# Patient Record
Sex: MALE | Race: WHITE | NOT HISPANIC OR LATINO | Employment: FULL TIME | URBAN - METROPOLITAN AREA
[De-identification: names, ages, dates, MRNs, and addresses within clinical notes are randomized per-mention and may not be internally consistent; named-entity substitution may affect disease eponyms.]

---

## 2017-02-12 ENCOUNTER — HOSPITAL ENCOUNTER (OUTPATIENT)
Dept: RADIOLOGY | Facility: MEDICAL CENTER | Age: 37
End: 2017-02-12
Attending: FAMILY MEDICINE
Payer: COMMERCIAL

## 2017-02-12 DIAGNOSIS — G64 OTHER DISORDERS OF PERIPHERAL NERVOUS SYSTEM: ICD-10-CM

## 2017-02-12 PROCEDURE — 72100 X-RAY EXAM L-S SPINE 2/3 VWS: CPT

## 2018-04-11 ENCOUNTER — HOSPITAL ENCOUNTER (EMERGENCY)
Facility: MEDICAL CENTER | Age: 38
End: 2018-04-11
Attending: EMERGENCY MEDICINE
Payer: COMMERCIAL

## 2018-04-11 VITALS
BODY MASS INDEX: 34.2 KG/M2 | SYSTOLIC BLOOD PRESSURE: 135 MMHG | HEIGHT: 71 IN | RESPIRATION RATE: 16 BRPM | WEIGHT: 244.27 LBS | TEMPERATURE: 98.1 F | HEART RATE: 78 BPM | DIASTOLIC BLOOD PRESSURE: 72 MMHG | OXYGEN SATURATION: 100 %

## 2018-04-11 DIAGNOSIS — K22.2 ESOPHAGEAL STRICTURE: ICD-10-CM

## 2018-04-11 PROCEDURE — 700102 HCHG RX REV CODE 250 W/ 637 OVERRIDE(OP): Performed by: EMERGENCY MEDICINE

## 2018-04-11 PROCEDURE — 99284 EMERGENCY DEPT VISIT MOD MDM: CPT

## 2018-04-11 PROCEDURE — A9270 NON-COVERED ITEM OR SERVICE: HCPCS | Performed by: EMERGENCY MEDICINE

## 2018-04-11 RX ADMIN — LIDOCAINE HYDROCHLORIDE 30 ML: 20 SOLUTION OROPHARYNGEAL at 17:03

## 2018-04-11 NOTE — ED TRIAGE NOTES
Chief Complaint   Patient presents with   • Other     Patient states he was eating ribs today and felt like he had a piece stuck in his esophagus. Says he was able to cough some up but then felt a burning sensation. Patient appears anxious states he feels dizzy and is having tingling to bilateral hands. Will inform charge RN of patient. VSS.

## 2018-04-11 NOTE — ED PROVIDER NOTES
"ED Provider Note    CHIEF COMPLAINT  Chief Complaint   Patient presents with   • Other       HPI  William Carroll is a 38 y.o. male who presents for evaluation of difficulty swallowing. The patient reports that he intermittently feels like he gets food stuck in his mid chest. He was eating a large piece of meat and felt like it got stuck but then coughed some of it up and was able to swallow after that. He has never had any known diagnosis of esophageal stricture or had any food bolus impaction. He has never seen gastroenterology is otherwise healthy with no stated or significant medical or surgical history    REVIEW OF SYSTEMS  See HPI for further details. No high fevers chills night sweats or weight loss All other systems are negative.     PAST MEDICAL HISTORY  No past medical history on file.  None reported  FAMILY HISTORY  Noncontributory    SOCIAL HISTORY  Social History     Social History   • Marital status:      Spouse name: N/A   • Number of children: N/A   • Years of education: N/A     Social History Main Topics   • Smoking status: Not on file   • Smokeless tobacco: Not on file   • Alcohol use Not on file   • Drug use: Unknown   • Sexual activity: Not on file     Other Topics Concern   • Not on file     Social History Narrative   • No narrative on file   Denies IV drugs    SURGICAL HISTORY  No past surgical history on file.  No major surgeries  CURRENT MEDICATIONS  Home Medications    **Home medications have not yet been reviewed for this encounter**         ALLERGIES  No Known Allergies    PHYSICAL EXAM  VITAL SIGNS: /83   Pulse 81   Temp 36.7 °C (98.1 °F)   Resp 17   Ht 1.803 m (5' 11\")   Wt 110.8 kg (244 lb 4.3 oz)   SpO2 100%   BMI 34.07 kg/m²  Room air O2: 100    Constitutional: Well developed, Well nourished, No acute distress, Non-toxic appearance.   HENT: Normocephalic, Atraumatic, Bilateral external ears normal, Oropharynx moist, No oral exudates, Nose normal.   Eyes: PERRLA, " EOMI, Conjunctiva normal, No discharge.   Cardiovascular: Normal heart rate, Normal rhythm, No murmurs, No rubs, No gallops.   Thorax & Lungs: Normal breath sounds, No respiratory distress, No wheezing, No chest tenderness.   Abdomen: Bowel sounds normal, Soft, No tenderness, No masses, No pulsatile masses.   Skin: Warm, Dry, No erythema, No rash.   Back: No tenderness, No CVA tenderness.   Extremities: Intact distal pulses, No edema, No tenderness, No cyanosis, No clubbing.   Neurologic: Alert & oriented x 3, Normal motor function, Normal sensory function, No focal deficits noted.   Psychiatric: Anxious      COURSE & MEDICAL DECISION MAKING  The patient has a history and physical exam strongly suggests esophageal stricture. He was able to swallow a GI cocktail and actually provided some relief. I counseled him that he needs to his food, start taking over-the-counter Prilosec and take Maalox as needed. He'll be referred to gastroenterology as I feel he will likely require elective upper endoscopy and stricture dilatation    FINAL IMPRESSION  1.  1. Esophageal stricture          Electronically signed by: Nicho Childs, 4/11/2018 4:51 PM

## 2018-04-12 NOTE — DISCHARGE INSTRUCTIONS
Esophageal Stricture  Start taking an over-the-counter acid blocker such as Prilosec. Take Maalox as needed. She will of your food very well before swallowing follow up with gastroenterology as discussed  Introduction  Esophageal stricture is a condition that causes the esophagus to become narrow. The esophagus is the long tube in your throat that carries food and liquid from your mouth to your stomach. Esophageal stricture can make it difficult, painful, or even impossible to swallow. The condition also makes choking more likely.  What are the causes?  Gastroesophageal reflux disease (GERD) is the most common cause of esophageal stricture. In GERD, stomach acid backs up into the esophagus. Over time, this causes scar tissue and leads to narrowing (stricture).  Other causes of esophageal stricture include:  · Scarring from ingesting a harmful substance.  · Damage from medical instruments used in the esophagus.  · Radiation therapy.  · Cancer.  What increases the risk?  You are at greater risk for esophageal stricture if you have GERD or esophageal cancer.  What are the signs or symptoms?  Signs and symptoms of esophageal stricture include:  · Difficulty swallowing.  · Pain when swallowing.  · Heartburn.  · Vomiting or spitting up (regurgitating) food or liquids.  · Weight loss.  How is this diagnosed?  Your health care provider may suspect esophageal stricture based on your symptoms. A physical exam will also be done. You may need tests to confirm the diagnosis. These can include:  · Upper endoscopy. Your health care provider will insert a flexible tube with a tiny camera on it (endoscope) into your esophagus to check for a stricture. A tissue sample may also be taken to be examined under a microscope (biopsy).  · Esophageal pH monitoring. This test involves collecting acid in the esophagus with a tube to determine how much stomach acid is entering the esophagus.  · Barium swallow test. For this test, you will drink  a barium solution that coats the lining of the esophagus. Then you will have an X-ray taken. The barium solution helps to show if there is stricture.  How is this treated?  Treatment for esophageal stricture depends on what is causing your condition and how severe it is. Treatment options include:  · Esophageal dilatation. In this procedure, a health care provider inserts an endoscope or a tool called a dilator into your esophagus to gently stretch it and make the opening wider.  · Stents. In some cases, your health care provider may place a small device (stent) in the esophagus to keep it open.  · Acid-blocking medicines. Taking these helps manage GERD symptoms after an esophageal stricture. This can prevent the stricture from returning.  Follow these instructions at home:  · Do not drink alcohol.  · Do not use any tobacco products, including cigarettes, chewing tobacco, or electronic cigarettes. If you need help quitting, ask your health care provider.  · Lose weight if you are overweight.  · Wear loose, comfortable clothing.  · Do not eat for 3 hours before bedtime.  · Elevate your head in bed with pillows.  · Do not overeat at meals.  · Do not eat foods that make reflux worse. These include:  ¨ Fatty foods.  ¨ Spicy foods.  ¨ Soda.  ¨ Tomato products.  ¨ Chocolate.  Contact a health care provider if:  · You have problems eating or swallowing.  · You regurgitate food and liquid.  This information is not intended to replace advice given to you by your health care provider. Make sure you discuss any questions you have with your health care provider.  Document Released: 08/28/2007 Document Revised: 05/25/2017 Document Reviewed: 04/29/2015  © 2017 [employer]

## 2018-08-10 ENCOUNTER — OFFICE VISIT (OUTPATIENT)
Dept: URGENT CARE | Facility: PHYSICIAN GROUP | Age: 38
End: 2018-08-10
Payer: COMMERCIAL

## 2018-08-10 VITALS
TEMPERATURE: 99 F | BODY MASS INDEX: 34.87 KG/M2 | RESPIRATION RATE: 16 BRPM | DIASTOLIC BLOOD PRESSURE: 82 MMHG | HEART RATE: 75 BPM | WEIGHT: 250 LBS | OXYGEN SATURATION: 95 % | SYSTOLIC BLOOD PRESSURE: 124 MMHG

## 2018-08-10 DIAGNOSIS — B00.9 HERPES SIMPLEX: ICD-10-CM

## 2018-08-10 PROCEDURE — 99214 OFFICE O/P EST MOD 30 MIN: CPT | Performed by: FAMILY MEDICINE

## 2018-08-10 RX ORDER — FAMCICLOVIR 500 MG/1
1000 TABLET ORAL 2 TIMES DAILY
Qty: 6 TAB | Refills: 0 | Status: SHIPPED | OUTPATIENT
Start: 2018-08-10 | End: 2018-08-12

## 2018-08-10 RX ORDER — OMEPRAZOLE 20 MG/1
20 CAPSULE, DELAYED RELEASE ORAL DAILY
COMMUNITY
End: 2020-10-21 | Stop reason: DRUGHIGH

## 2018-08-10 ASSESSMENT — PAIN SCALES - GENERAL: PAINLEVEL: NO PAIN

## 2018-08-10 NOTE — PROGRESS NOTES
Subjective:      William Carroll is a 38 y.o. male who presents with Cold Sores (cold sore x 2 days)    Patient presents to urgent care with acute onset of new problem over the past 2 days he has developed lesions that have been simultaneous around his nasal area as well as his right sided upper lip.  He has been applying some topical over-the-counter antiviral medicine without any improvement in symptoms.  He denies any systemic symptoms of fevers chills nausea no body aches. He does have a h/o oral herpes simplex    HPI  Review of Systems   All other systems reviewed and are negative.    PMH:esophageal stricture, oral herpes simplex  MEDS:   Current Outpatient Prescriptions:   •  omeprazole (PRILOSEC) 20 MG delayed-release capsule, Take 20 mg by mouth every day., Disp: , Rfl:   •  famciclovir (FAMVIR) 500 MG Tab, Take 2 Tabs by mouth 2 Times a Day for 2 days. With food, Disp: 6 Tab, Rfl: 0  ALLERGIES: No Known Allergies  SURGHX: History reviewed. No pertinent surgical history.  SOCHX:  reports that he has never smoked. He has never used smokeless tobacco.  FH: Family history was reviewed, no pertinent findings to report     Objective:     /82   Pulse 75   Temp 37.2 °C (99 °F)   Resp 16   Wt 113.4 kg (250 lb)   SpO2 95%   BMI 34.87 kg/m²      Physical Exam   Constitutional: He is oriented to person, place, and time. He appears well-developed and well-nourished. No distress.   HENT:   Head:       Mouth/Throat: Oropharynx is clear and moist.   3 distinct areas on the nasal area an erythematous patch with vesicular lesions negative Fields sign no lesions on the eyes.   Eyes: Pupils are equal, round, and reactive to light. Conjunctivae and EOM are normal.   Neck: Normal range of motion.   Cardiovascular: Normal rate.    Pulmonary/Chest: Effort normal.   Musculoskeletal: Normal range of motion. He exhibits no edema or tenderness.   Lymphadenopathy:     He has no cervical adenopathy.   Neurological: He  is alert and oriented to person, place, and time.   Skin: Skin is warm and dry. Rash noted. He is not diaphoretic. There is erythema.   Psychiatric: He has a normal mood and affect. His behavior is normal.          Assessment/Plan:     1. Herpes simplex    - famciclovir (FAMVIR) 500 MG Tab; Take 2 Tabs by mouth 2 Times a Day for 2 days. With food  Dispense: 6 Tab; Refill: 0      Likely oral herpes simplex but given the distribution around the nasal area must also consider zoster.    Differential diagnosis, natural history, supportive care discussed. Follow-up with primary care provider within 7-10 days, emergency room precautions discussed.  Patient and/or family appears understanding of information.

## 2018-08-13 ENCOUNTER — OFFICE VISIT (OUTPATIENT)
Dept: URGENT CARE | Facility: PHYSICIAN GROUP | Age: 38
End: 2018-08-13
Payer: COMMERCIAL

## 2018-08-13 VITALS
SYSTOLIC BLOOD PRESSURE: 122 MMHG | WEIGHT: 250 LBS | BODY MASS INDEX: 35 KG/M2 | RESPIRATION RATE: 18 BRPM | OXYGEN SATURATION: 96 % | HEART RATE: 76 BPM | DIASTOLIC BLOOD PRESSURE: 68 MMHG | HEIGHT: 71 IN | TEMPERATURE: 98.3 F

## 2018-08-13 DIAGNOSIS — B00.9 HERPES SIMPLEX DISEASE: ICD-10-CM

## 2018-08-13 PROCEDURE — 99214 OFFICE O/P EST MOD 30 MIN: CPT | Performed by: NURSE PRACTITIONER

## 2018-08-13 RX ORDER — FAMCICLOVIR 500 MG/1
500 TABLET ORAL 3 TIMES DAILY
Qty: 21 TAB | Refills: 0 | Status: SHIPPED | OUTPATIENT
Start: 2018-08-13 | End: 2018-08-20

## 2018-08-13 ASSESSMENT — ENCOUNTER SYMPTOMS
NAUSEA: 0
DIZZINESS: 0
CHILLS: 0
HEADACHES: 0
SENSORY CHANGE: 1
TINGLING: 1
VOMITING: 0
DIARRHEA: 0
FEVER: 0
MYALGIAS: 0

## 2018-08-14 NOTE — PROGRESS NOTES
"Subjective:      William Carroll is a 38 y.o. male who presents with Cold Sores (cold sore on R side of nose)            HPI Recurrent problem. 38 year old male with cold sores since last week. Was seen on Friday and given famciclovir for 2 days. He presents today for re check. Sores are scabbed but he feels occasional tingle in right maxillary area. Denies fever, chills, myalgia, nausea or diarrhea.   Patient has no known allergies.  Current Outpatient Prescriptions on File Prior to Visit   Medication Sig Dispense Refill   • omeprazole (PRILOSEC) 20 MG delayed-release capsule Take 20 mg by mouth every day.       No current facility-administered medications on file prior to visit.      Social History     Social History   • Marital status:      Spouse name: N/A   • Number of children: N/A   • Years of education: N/A     Occupational History   • Not on file.     Social History Main Topics   • Smoking status: Never Smoker   • Smokeless tobacco: Never Used   • Alcohol use Not on file   • Drug use: Unknown   • Sexual activity: Not on file     Other Topics Concern   • Not on file     Social History Narrative   • No narrative on file     family history is not on file.      Review of Systems   Constitutional: Negative for chills and fever.   Gastrointestinal: Negative for diarrhea, nausea and vomiting.   Musculoskeletal: Negative for myalgias.   Skin: Positive for rash.   Neurological: Positive for tingling and sensory change. Negative for dizziness and headaches.          Objective:     /68   Pulse 76   Temp 36.8 °C (98.3 °F)   Resp 18   Ht 1.803 m (5' 11\")   Wt 113.4 kg (250 lb)   SpO2 96%   BMI 34.87 kg/m²      Physical Exam   Constitutional: He is oriented to person, place, and time. He appears well-developed and well-nourished. No distress.   HENT:   Head: Normocephalic and atraumatic.   Right Ear: External ear and ear canal normal. Tympanic membrane is not injected and not perforated. No middle " ear effusion.   Left Ear: External ear and ear canal normal. Tympanic membrane is not injected and not perforated.  No middle ear effusion.   Nose: No mucosal edema.   Mouth/Throat: No oropharyngeal exudate or posterior oropharyngeal erythema.   Eyes: Conjunctivae are normal. Right eye exhibits no discharge. Left eye exhibits no discharge.   Neck: Normal range of motion. Neck supple.   Cardiovascular: Normal rate, regular rhythm and normal heart sounds.    No murmur heard.  Pulmonary/Chest: Effort normal and breath sounds normal. No respiratory distress.   Musculoskeletal: Normal range of motion.   Normal movement of all 4 extremities.   Lymphadenopathy:     He has no cervical adenopathy.        Right: No supraclavicular adenopathy present.        Left: No supraclavicular adenopathy present.   Neurological: He is alert and oriented to person, place, and time. Gait normal.   Skin: Skin is warm and dry. Rash noted. Rash is vesicular.        Psychiatric: He has a normal mood and affect. His behavior is normal. Thought content normal.   Nursing note and vitals reviewed.              Assessment/Plan:     1. Herpes simplex disease  famciclovir (FAMVIR) 500 MG Tab     Reassurance that all looks good.  Because of continued tingling sensation, will add to famiciclovir should he need it. Discussed using only if he has new lesions appear. At this time all lesions are dry and scabbed. Cautioned on appearance of eye/inner ear symptoms.  Patient demonstrates good understanding of treatment plan as well as follow up instructions.

## 2018-08-16 ENCOUNTER — OFFICE VISIT (OUTPATIENT)
Dept: URGENT CARE | Facility: PHYSICIAN GROUP | Age: 38
End: 2018-08-16
Payer: COMMERCIAL

## 2018-08-16 VITALS
SYSTOLIC BLOOD PRESSURE: 122 MMHG | RESPIRATION RATE: 18 BRPM | HEIGHT: 71 IN | TEMPERATURE: 98.8 F | HEART RATE: 94 BPM | BODY MASS INDEX: 35 KG/M2 | OXYGEN SATURATION: 94 % | DIASTOLIC BLOOD PRESSURE: 80 MMHG | WEIGHT: 250 LBS

## 2018-08-16 DIAGNOSIS — R11.10 INTRACTABLE VOMITING, PRESENCE OF NAUSEA NOT SPECIFIED, UNSPECIFIED VOMITING TYPE: ICD-10-CM

## 2018-08-16 PROCEDURE — 99213 OFFICE O/P EST LOW 20 MIN: CPT | Performed by: EMERGENCY MEDICINE

## 2018-08-16 RX ORDER — ONDANSETRON 4 MG/1
4 TABLET, ORALLY DISINTEGRATING ORAL EVERY 6 HOURS PRN
Qty: 10 TAB | Refills: 0 | Status: SHIPPED | OUTPATIENT
Start: 2018-08-16 | End: 2019-01-15

## 2018-08-16 RX ORDER — ONDANSETRON 4 MG/1
4 TABLET, ORALLY DISINTEGRATING ORAL EVERY 8 HOURS PRN
Qty: 10 TAB | Refills: 0 | Status: SHIPPED | OUTPATIENT
Start: 2018-08-16

## 2018-08-16 NOTE — PROGRESS NOTES
"Subjective:      William Carroll is a 38 y.o. male who presents with Emesis (abd pain, vomiting starting this morning)            HPI  Patient is a 38-year-old male complaining of vomiting associated abdominal pain started this morning patient has been recently evaluated for this in the urgent care.as well as in the ED and referred to GI (suspected stricture)  Pt now has vomtiing and some diarrhea, no risk factors of travel or antibiotics, etc.  PMH:  has no past medical history on file.  MEDS:   Current Outpatient Prescriptions:   •  ondansetron (ZOFRAN ODT) 4 MG TABLET DISPERSIBLE, Take 1 Tab by mouth every 8 hours as needed., Disp: 10 Tab, Rfl: 0  •  ondansetron (ZOFRAN ODT) 4 MG TABLET DISPERSIBLE, Take 1 Tab by mouth every 6 hours as needed for Nausea., Disp: 10 Tab, Rfl: 0  •  famciclovir (FAMVIR) 500 MG Tab, Take 1 Tab by mouth 3 times a day for 7 days., Disp: 21 Tab, Rfl: 0  •  omeprazole (PRILOSEC) 20 MG delayed-release capsule, Take 20 mg by mouth every day., Disp: , Rfl:   ALLERGIES: No Known Allergies  SURGHX: History reviewed. No pertinent surgical history.  SOCHX:  reports that he has never smoked. He has never used smokeless tobacco.  FH: family history is not on file.  Review of Systems   Constitutional: Negative for chills and fever.   HENT: Negative for congestion.    Respiratory: Negative for cough, sputum production and shortness of breath.    Cardiovascular: Negative for chest pain and palpitations.   Gastrointestinal: Negative for abdominal pain, diarrhea, nausea and vomiting.   Genitourinary: Negative.    Musculoskeletal: Negative for neck pain.   Skin: Positive for rash.        Herpes labialis   Neurological: Negative for sensory change and speech change.   Psychiatric/Behavioral: The patient is not nervous/anxious.           Objective:     /80   Pulse 94   Temp 37.1 °C (98.8 °F)   Resp 18   Ht 1.803 m (5' 11\")   Wt 113.4 kg (250 lb)   SpO2 94%   BMI 34.87 kg/m²      Physical " Exam   Constitutional: He appears well-developed and well-nourished. No distress.   HENT:   Head: Normocephalic and atraumatic.   Right Ear: External ear normal.   Eyes: Conjunctivae are normal. No scleral icterus.   Neck: Normal range of motion.   Cardiovascular: Normal rate and regular rhythm.    Pulmonary/Chest: Effort normal and breath sounds normal. No respiratory distress. He has no wheezes. He has no rales.   Abdominal: He exhibits no distension. There is no tenderness.   Musculoskeletal: Normal range of motion.   Neurological: He is alert.   Skin: Skin is warm and dry. Rash (residual herpes on lip) noted. He is not diaphoretic.   Psychiatric: He has a normal mood and affect. His behavior is normal.   Nursing note and vitals reviewed.              Assessment/Plan:     Diagnosis: Vomiting.    Pt will be started on Zofran and needs to go to the ED if unable to hold down liquids, will bring in stool if diarrhea continues and a note for 1-2 off from work

## 2018-08-16 NOTE — LETTER
August 16, 2018        William Carroll  [address] [address]        Dear William:    Please aspirin next 1-2 days off from work for medical reasons.    If you have any questions or concerns, please don't hesitate to call.        Sincerely,        Oskar Johnson M.D.    Electronically Signed

## 2018-08-17 ENCOUNTER — HOSPITAL ENCOUNTER (OUTPATIENT)
Facility: MEDICAL CENTER | Age: 38
End: 2018-08-17
Attending: EMERGENCY MEDICINE
Payer: COMMERCIAL

## 2018-08-17 DIAGNOSIS — R11.10 INTRACTABLE VOMITING, PRESENCE OF NAUSEA NOT SPECIFIED, UNSPECIFIED VOMITING TYPE: ICD-10-CM

## 2018-08-17 PROCEDURE — 87045 FECES CULTURE AEROBIC BACT: CPT

## 2018-08-17 PROCEDURE — 87899 AGENT NOS ASSAY W/OPTIC: CPT

## 2018-08-17 PROCEDURE — 87046 STOOL CULTR AEROBIC BACT EA: CPT

## 2018-08-18 LAB
E COLI SXT1+2 STL IA: NORMAL
SIGNIFICANT IND 70042: NORMAL
SITE SITE: NORMAL
SOURCE SOURCE: NORMAL

## 2018-08-18 ASSESSMENT — ENCOUNTER SYMPTOMS
SHORTNESS OF BREATH: 0
NERVOUS/ANXIOUS: 0
SPUTUM PRODUCTION: 0
ABDOMINAL PAIN: 0
ROS SKIN COMMENTS: HERPES LABIALIS
COUGH: 0
CHILLS: 0
SPEECH CHANGE: 0
FEVER: 0
NECK PAIN: 0
VOMITING: 0
SENSORY CHANGE: 0
PALPITATIONS: 0
DIARRHEA: 0
NAUSEA: 0

## 2018-08-20 LAB
BACTERIA STL CULT: NORMAL
E COLI SXT1+2 STL IA: NORMAL
SIGNIFICANT IND 70042: NORMAL
SITE SITE: NORMAL
SOURCE SOURCE: NORMAL

## 2018-08-22 ENCOUNTER — TELEPHONE (OUTPATIENT)
Dept: URGENT CARE | Facility: PHYSICIAN GROUP | Age: 38
End: 2018-08-22

## 2019-01-15 ENCOUNTER — OFFICE VISIT (OUTPATIENT)
Dept: MEDICAL GROUP | Facility: PHYSICIAN GROUP | Age: 39
End: 2019-01-15
Payer: COMMERCIAL

## 2019-01-15 VITALS
WEIGHT: 244 LBS | BODY MASS INDEX: 34.16 KG/M2 | HEIGHT: 71 IN | TEMPERATURE: 97.9 F | SYSTOLIC BLOOD PRESSURE: 122 MMHG | RESPIRATION RATE: 16 BRPM | DIASTOLIC BLOOD PRESSURE: 80 MMHG | OXYGEN SATURATION: 94 % | HEART RATE: 71 BPM

## 2019-01-15 DIAGNOSIS — Z76.89 ENCOUNTER TO ESTABLISH CARE WITH NEW DOCTOR: ICD-10-CM

## 2019-01-15 DIAGNOSIS — Z00.00 WELLNESS EXAMINATION: ICD-10-CM

## 2019-01-15 DIAGNOSIS — K21.9 GASTROESOPHAGEAL REFLUX DISEASE, ESOPHAGITIS PRESENCE NOT SPECIFIED: ICD-10-CM

## 2019-01-15 PROCEDURE — 99213 OFFICE O/P EST LOW 20 MIN: CPT | Performed by: NURSE PRACTITIONER

## 2019-01-15 ASSESSMENT — PATIENT HEALTH QUESTIONNAIRE - PHQ9
SUM OF ALL RESPONSES TO PHQ QUESTIONS 1-9: 7
5. POOR APPETITE OR OVEREATING: 1 - SEVERAL DAYS
CLINICAL INTERPRETATION OF PHQ2 SCORE: 2

## 2019-01-16 NOTE — PROGRESS NOTES
Chief Complaint   Patient presents with   • Establish Care   • Gastrophageal Reflux     x1year        HISTORY OF PRESENT ILLNESS: Patient is a 38 y.o. male new patient who presents today to discuss the following issues:    Encounter to establish care with new doctor  Is here to establish with a new primary care provider.        Gastroesophageal reflux disease  Has ongoing intermittent issues with GERD.  Takes prilosec as needed.  Discussed avoiding triggers, taking the omeprazole more regularly, and avoid laying down after eating.      Patient Active Problem List    Diagnosis Date Noted   • Gastroesophageal reflux disease 01/22/2019   • Encounter to establish care with new doctor 01/15/2019   • Wellness examination 01/15/2019       Allergies:Patient has no known allergies.    Current Outpatient Prescriptions   Medication Sig Dispense Refill   • omeprazole (PRILOSEC) 20 MG delayed-release capsule Take 20 mg by mouth every day.     • ondansetron (ZOFRAN ODT) 4 MG TABLET DISPERSIBLE Take 1 Tab by mouth every 8 hours as needed. 10 Tab 0     No current facility-administered medications for this visit.        Social History   Substance Use Topics   • Smoking status: Never Smoker   • Smokeless tobacco: Never Used   • Alcohol use No       No family status information on file.   History reviewed. No pertinent family history.    Review of Systems:   Constitutional: Negative for fever, chills, weight loss and malaise/fatigue.   HENT: Negative for ear pain, nosebleeds, congestion, sore throat and neck pain.    Eyes: Negative for blurred vision.   Respiratory: Negative for cough, sputum production, shortness of breath and wheezing.    Cardiovascular: Negative for chest pain, palpitations, orthopnea and leg swelling.   Gastrointestinal: Negative for nausea, vomiting and abdominal pain. Positive for heartburn.  Genitourinary: Negative for dysuria, urgency and frequency.   Musculoskeletal: Negative for myalgias, joint pain, and  "back pain.  Skin: Negative for rash and itching.   Neurological: Negative for dizziness, tingling, tremors, sensory change, focal weakness and headaches.   Endo/Heme/Allergies: Does not bruise/bleed easily.   Psychiatric/Behavioral: Negative for depression, suicidal ideas and memory loss.  The patient is not nervous/anxious and does not have insomnia.    All other systems reviewed and are negative except as in HPI.    Exam:  Blood pressure 122/80, pulse 71, temperature 36.6 °C (97.9 °F), resp. rate 16, height 1.803 m (5' 11\"), weight 110.7 kg (244 lb), SpO2 94 %.  General:  Well nourished, well developed male in NAD  Head: Grossly normal.  Neck: Supple without JVD or bruit. Thyroid is not enlarged.  Pulmonary: Clear to ausculation. Normal effort. No rales, ronchi, or wheezing.  Cardiovascular: Regular rate and rhythm without murmur.   Abdomen:  Bowel sounds + x 4. Soft, non-tender, nondistended.  Extremities: No clubbing, cyanosis, or edema.  Skin: Intact with no obvious rashes or lesions.  Neuro: Grossly intact.  Psych: Alert and oriented x 3.  Mood and affect appropriate.    Medical decision-making and discussion: William is here to establish with a new primary care provider.  We reviewed his past medical history and discussed his current medications.  Lab work was ordered. He will sign a records release for his previous provider, he will sign up with [facility], and he will plan to follow-up here as needed.         Assessment/Plan:  1. Wellness examination  CBC WITH DIFFERENTIAL    COMP METABOLIC PANEL    Lipid Profile    VITAMIN D,25 HYDROXY   2. Encounter to establish care with new doctor     3. Gastroesophageal reflux disease, esophagitis presence not specified         Return if symptoms worsen or fail to improve.    Please note that this dictation was created using voice recognition software. I have made every reasonable attempt to correct obvious errors, but I expect that there are errors of grammar and possibly " content that I did not discover before finalizing the note.

## 2019-01-22 PROBLEM — K21.9 GASTROESOPHAGEAL REFLUX DISEASE: Status: ACTIVE | Noted: 2019-01-22

## 2019-01-22 NOTE — ASSESSMENT & PLAN NOTE
Has ongoing intermittent issues with GERD.  Takes prilosec as needed.  Discussed avoiding triggers, taking the omeprazole more regularly, and avoid laying down after eating.

## 2019-01-30 ENCOUNTER — OFFICE VISIT (OUTPATIENT)
Dept: URGENT CARE | Facility: PHYSICIAN GROUP | Age: 39
End: 2019-01-30
Payer: COMMERCIAL

## 2019-01-30 ENCOUNTER — HOSPITAL ENCOUNTER (OUTPATIENT)
Dept: LAB | Facility: MEDICAL CENTER | Age: 39
End: 2019-01-30
Attending: NURSE PRACTITIONER
Payer: COMMERCIAL

## 2019-01-30 VITALS
TEMPERATURE: 97.4 F | OXYGEN SATURATION: 97 % | BODY MASS INDEX: 32.9 KG/M2 | SYSTOLIC BLOOD PRESSURE: 124 MMHG | HEART RATE: 79 BPM | WEIGHT: 235 LBS | DIASTOLIC BLOOD PRESSURE: 78 MMHG | RESPIRATION RATE: 16 BRPM | HEIGHT: 71 IN

## 2019-01-30 DIAGNOSIS — Z00.00 WELLNESS EXAMINATION: ICD-10-CM

## 2019-01-30 DIAGNOSIS — J02.9 PHARYNGITIS, UNSPECIFIED ETIOLOGY: ICD-10-CM

## 2019-01-30 DIAGNOSIS — R05.9 COUGH: ICD-10-CM

## 2019-01-30 LAB
25(OH)D3 SERPL-MCNC: 9 NG/ML (ref 30–100)
ALBUMIN SERPL BCP-MCNC: 4.6 G/DL (ref 3.2–4.9)
ALBUMIN/GLOB SERPL: 1.3 G/DL
ALP SERPL-CCNC: 55 U/L (ref 30–99)
ALT SERPL-CCNC: 22 U/L (ref 2–50)
ANION GAP SERPL CALC-SCNC: 11 MMOL/L (ref 0–11.9)
AST SERPL-CCNC: 23 U/L (ref 12–45)
BASOPHILS # BLD AUTO: 0.6 % (ref 0–1.8)
BASOPHILS # BLD: 0.04 K/UL (ref 0–0.12)
BILIRUB SERPL-MCNC: 0.8 MG/DL (ref 0.1–1.5)
BUN SERPL-MCNC: 14 MG/DL (ref 8–22)
CALCIUM SERPL-MCNC: 9.6 MG/DL (ref 8.5–10.5)
CHLORIDE SERPL-SCNC: 104 MMOL/L (ref 96–112)
CHOLEST SERPL-MCNC: 199 MG/DL (ref 100–199)
CO2 SERPL-SCNC: 27 MMOL/L (ref 20–33)
CREAT SERPL-MCNC: 0.93 MG/DL (ref 0.5–1.4)
EOSINOPHIL # BLD AUTO: 0.19 K/UL (ref 0–0.51)
EOSINOPHIL NFR BLD: 3.1 % (ref 0–6.9)
ERYTHROCYTE [DISTWIDTH] IN BLOOD BY AUTOMATED COUNT: 41.3 FL (ref 35.9–50)
FASTING STATUS PATIENT QL REPORTED: NORMAL
GLOBULIN SER CALC-MCNC: 3.6 G/DL (ref 1.9–3.5)
GLUCOSE SERPL-MCNC: 95 MG/DL (ref 65–99)
HCT VFR BLD AUTO: 50.3 % (ref 42–52)
HDLC SERPL-MCNC: 32 MG/DL
HGB BLD-MCNC: 16.8 G/DL (ref 14–18)
IMM GRANULOCYTES # BLD AUTO: 0.02 K/UL (ref 0–0.11)
IMM GRANULOCYTES NFR BLD AUTO: 0.3 % (ref 0–0.9)
LDLC SERPL CALC-MCNC: 147 MG/DL
LYMPHOCYTES # BLD AUTO: 1.22 K/UL (ref 1–4.8)
LYMPHOCYTES NFR BLD: 19.7 % (ref 22–41)
MCH RBC QN AUTO: 29.6 PG (ref 27–33)
MCHC RBC AUTO-ENTMCNC: 33.4 G/DL (ref 33.7–35.3)
MCV RBC AUTO: 88.7 FL (ref 81.4–97.8)
MONOCYTES # BLD AUTO: 0.7 K/UL (ref 0–0.85)
MONOCYTES NFR BLD AUTO: 11.3 % (ref 0–13.4)
NEUTROPHILS # BLD AUTO: 4.01 K/UL (ref 1.82–7.42)
NEUTROPHILS NFR BLD: 65 % (ref 44–72)
NRBC # BLD AUTO: 0 K/UL
NRBC BLD-RTO: 0 /100 WBC
PLATELET # BLD AUTO: 211 K/UL (ref 164–446)
PMV BLD AUTO: 11 FL (ref 9–12.9)
POTASSIUM SERPL-SCNC: 3.9 MMOL/L (ref 3.6–5.5)
PROT SERPL-MCNC: 8.2 G/DL (ref 6–8.2)
RBC # BLD AUTO: 5.67 M/UL (ref 4.7–6.1)
SODIUM SERPL-SCNC: 142 MMOL/L (ref 135–145)
TRIGL SERPL-MCNC: 99 MG/DL (ref 0–149)
WBC # BLD AUTO: 6.2 K/UL (ref 4.8–10.8)

## 2019-01-30 PROCEDURE — 85025 COMPLETE CBC W/AUTO DIFF WBC: CPT

## 2019-01-30 PROCEDURE — 99214 OFFICE O/P EST MOD 30 MIN: CPT | Performed by: FAMILY MEDICINE

## 2019-01-30 PROCEDURE — 36415 COLL VENOUS BLD VENIPUNCTURE: CPT

## 2019-01-30 PROCEDURE — 82306 VITAMIN D 25 HYDROXY: CPT

## 2019-01-30 PROCEDURE — 80053 COMPREHEN METABOLIC PANEL: CPT

## 2019-01-30 PROCEDURE — 80061 LIPID PANEL: CPT

## 2019-01-30 RX ORDER — PROMETHAZINE HYDROCHLORIDE AND CODEINE PHOSPHATE 6.25; 1 MG/5ML; MG/5ML
5 SYRUP ORAL 4 TIMES DAILY PRN
Qty: 120 ML | Refills: 0 | Status: SHIPPED | OUTPATIENT
Start: 2019-01-30 | End: 2019-02-06

## 2019-01-30 RX ORDER — AZITHROMYCIN 250 MG/1
TABLET, FILM COATED ORAL
Qty: 6 TAB | Refills: 0 | Status: SHIPPED | OUTPATIENT
Start: 2019-01-30 | End: 2020-09-21

## 2019-01-30 ASSESSMENT — ENCOUNTER SYMPTOMS
WEIGHT LOSS: 0
SENSORY CHANGE: 0
FOCAL WEAKNESS: 0
MYALGIAS: 0
EYE REDNESS: 0
EYE DISCHARGE: 0

## 2019-01-30 NOTE — NON-PROVIDER
Started Saturday night with mild to moderate sore throat. Taking DayQuil, Mucinex, and sore throat tea. By Monday afternoon felt mucous forming.   + Postnasal drip.   + Productive cough, thick yellow. sputum.   + Low appetite.   + Bodyaches.   No bloody or samantha   No fevers or chills.  No ear pain or pressure.  No SOB. No c/p.  No frontal or maxillary pain or pressure.   No hx asthma or PNA.

## 2019-01-30 NOTE — PROGRESS NOTES
"Subjective:      William Carroll is a 38 y.o. male who presents with Cough (cough, slight sore throat, congestion x 5days)            4 days productive cough without blood in sputum.  No fever chills.  Associated sore throat nasal congestion.  No shortness of breath or wheezing.  No past medical history of asthma or pneumonia.  Minimal relief with OTC medications.  Symptoms are moderate severity and persistent.  No other aggravating or alleviating factors.        Review of Systems   Constitutional: Negative for malaise/fatigue and weight loss.   HENT: Negative for ear discharge and ear pain.    Eyes: Negative for discharge and redness.   Musculoskeletal: Negative for joint pain and myalgias.   Skin: Negative for itching and rash.   Neurological: Negative for sensory change and focal weakness.     .  Medications, Allergies, and current problem list reviewed today in Epic       Objective:     /78 (BP Location: Left arm, Patient Position: Sitting, BP Cuff Size: Adult)   Pulse 79   Temp 36.3 °C (97.4 °F) (Temporal)   Resp 16   Ht 1.803 m (5' 11\")   Wt 106.6 kg (235 lb)   SpO2 97%   BMI 32.78 kg/m²      Physical Exam   Constitutional: He is oriented to person, place, and time. He appears well-developed and well-nourished. No distress.   HENT:   Head: Normocephalic and atraumatic.   Right Ear: External ear normal.   Left Ear: External ear normal.   Nasal congestion  Pharynx red without exudate     Eyes: Conjunctivae are normal.   Neck: Neck supple.   Cardiovascular: Normal rate, regular rhythm and normal heart sounds.    Pulmonary/Chest: Effort normal and breath sounds normal. He has no wheezes.   Lymphadenopathy:     He has no cervical adenopathy.   Neurological: He is alert and oriented to person, place, and time.   Skin: Skin is warm and dry. No rash noted.               Assessment/Plan:       "

## 2019-01-30 NOTE — LETTER
January 30, 2019         Patient: William Carroll   YOB: 1980   Date of Visit: 1/30/2019           To Whom it May Concern:    William Carroll was seen in my clinic on 1/30/2019.  Please excuse 1/28, 1/30, and 1/31/2019.      Sincerely,           Gerardo Puri M.D.  Electronically Signed

## 2019-02-05 RX ORDER — ERGOCALCIFEROL 1.25 MG/1
CAPSULE ORAL
Qty: 16 CAP | Refills: 0 | Status: SHIPPED | OUTPATIENT
Start: 2019-02-05

## 2019-03-13 RX ORDER — ERGOCALCIFEROL 1.25 MG/1
CAPSULE ORAL
Refills: 0 | OUTPATIENT
Start: 2019-03-13

## 2020-09-21 ENCOUNTER — HOSPITAL ENCOUNTER (OUTPATIENT)
Dept: LAB | Facility: MEDICAL CENTER | Age: 40
End: 2020-09-21
Attending: PHYSICIAN ASSISTANT
Payer: COMMERCIAL

## 2020-09-21 ENCOUNTER — OFFICE VISIT (OUTPATIENT)
Dept: MEDICAL GROUP | Facility: PHYSICIAN GROUP | Age: 40
End: 2020-09-21
Payer: COMMERCIAL

## 2020-09-21 VITALS
DIASTOLIC BLOOD PRESSURE: 82 MMHG | OXYGEN SATURATION: 96 % | HEIGHT: 71 IN | BODY MASS INDEX: 36.68 KG/M2 | WEIGHT: 262 LBS | SYSTOLIC BLOOD PRESSURE: 130 MMHG | RESPIRATION RATE: 12 BRPM | HEART RATE: 81 BPM | TEMPERATURE: 97.3 F

## 2020-09-21 DIAGNOSIS — R10.32 LEFT LOWER QUADRANT ABDOMINAL PAIN: ICD-10-CM

## 2020-09-21 DIAGNOSIS — E55.9 VITAMIN D DEFICIENCY: ICD-10-CM

## 2020-09-21 DIAGNOSIS — Z00.00 GENERAL MEDICAL EXAM: ICD-10-CM

## 2020-09-21 DIAGNOSIS — E78.5 DYSLIPIDEMIA: ICD-10-CM

## 2020-09-21 DIAGNOSIS — K21.9 GASTROESOPHAGEAL REFLUX DISEASE, ESOPHAGITIS PRESENCE NOT SPECIFIED: ICD-10-CM

## 2020-09-21 LAB
25(OH)D3 SERPL-MCNC: 22 NG/ML (ref 30–100)
ALBUMIN SERPL BCP-MCNC: 4.5 G/DL (ref 3.2–4.9)
ALBUMIN/GLOB SERPL: 1.5 G/DL
ALP SERPL-CCNC: 70 U/L (ref 30–99)
ALT SERPL-CCNC: 31 U/L (ref 2–50)
ANION GAP SERPL CALC-SCNC: 12 MMOL/L (ref 7–16)
APPEARANCE UR: CLEAR
AST SERPL-CCNC: 22 U/L (ref 12–45)
BASOPHILS # BLD AUTO: 0.9 % (ref 0–1.8)
BASOPHILS # BLD: 0.05 K/UL (ref 0–0.12)
BILIRUB SERPL-MCNC: 0.3 MG/DL (ref 0.1–1.5)
BILIRUB UR QL STRIP.AUTO: NEGATIVE
BUN SERPL-MCNC: 16 MG/DL (ref 8–22)
CALCIUM SERPL-MCNC: 9.9 MG/DL (ref 8.5–10.5)
CHLORIDE SERPL-SCNC: 103 MMOL/L (ref 96–112)
CHOLEST SERPL-MCNC: 234 MG/DL (ref 100–199)
CO2 SERPL-SCNC: 24 MMOL/L (ref 20–33)
COLOR UR: YELLOW
CREAT SERPL-MCNC: 0.83 MG/DL (ref 0.5–1.4)
EOSINOPHIL # BLD AUTO: 0.14 K/UL (ref 0–0.51)
EOSINOPHIL NFR BLD: 2.5 % (ref 0–6.9)
ERYTHROCYTE [DISTWIDTH] IN BLOOD BY AUTOMATED COUNT: 42.7 FL (ref 35.9–50)
FASTING STATUS PATIENT QL REPORTED: NORMAL
GLOBULIN SER CALC-MCNC: 3 G/DL (ref 1.9–3.5)
GLUCOSE SERPL-MCNC: 99 MG/DL (ref 65–99)
GLUCOSE UR STRIP.AUTO-MCNC: NEGATIVE MG/DL
HCT VFR BLD AUTO: 48.7 % (ref 42–52)
HDLC SERPL-MCNC: 38 MG/DL
HGB BLD-MCNC: 16.4 G/DL (ref 14–18)
IMM GRANULOCYTES # BLD AUTO: 0.03 K/UL (ref 0–0.11)
IMM GRANULOCYTES NFR BLD AUTO: 0.5 % (ref 0–0.9)
KETONES UR STRIP.AUTO-MCNC: NEGATIVE MG/DL
LDLC SERPL CALC-MCNC: 163 MG/DL
LEUKOCYTE ESTERASE UR QL STRIP.AUTO: NEGATIVE
LYMPHOCYTES # BLD AUTO: 1.62 K/UL (ref 1–4.8)
LYMPHOCYTES NFR BLD: 29 % (ref 22–41)
MCH RBC QN AUTO: 29.7 PG (ref 27–33)
MCHC RBC AUTO-ENTMCNC: 33.7 G/DL (ref 33.7–35.3)
MCV RBC AUTO: 88.1 FL (ref 81.4–97.8)
MICRO URNS: NORMAL
MONOCYTES # BLD AUTO: 0.61 K/UL (ref 0–0.85)
MONOCYTES NFR BLD AUTO: 10.9 % (ref 0–13.4)
NEUTROPHILS # BLD AUTO: 3.13 K/UL (ref 1.82–7.42)
NEUTROPHILS NFR BLD: 56.2 % (ref 44–72)
NITRITE UR QL STRIP.AUTO: NEGATIVE
NRBC # BLD AUTO: 0 K/UL
NRBC BLD-RTO: 0 /100 WBC
PH UR STRIP.AUTO: 6 [PH] (ref 5–8)
PLATELET # BLD AUTO: 200 K/UL (ref 164–446)
PMV BLD AUTO: 11.4 FL (ref 9–12.9)
POTASSIUM SERPL-SCNC: 4.6 MMOL/L (ref 3.6–5.5)
PROT SERPL-MCNC: 7.5 G/DL (ref 6–8.2)
PROT UR QL STRIP: NEGATIVE MG/DL
RBC # BLD AUTO: 5.53 M/UL (ref 4.7–6.1)
RBC UR QL AUTO: NEGATIVE
SODIUM SERPL-SCNC: 139 MMOL/L (ref 135–145)
SP GR UR STRIP.AUTO: 1.01
TRIGL SERPL-MCNC: 163 MG/DL (ref 0–149)
TSH SERPL DL<=0.005 MIU/L-ACNC: 2.28 UIU/ML (ref 0.38–5.33)
UROBILINOGEN UR STRIP.AUTO-MCNC: 0.2 MG/DL
WBC # BLD AUTO: 5.6 K/UL (ref 4.8–10.8)

## 2020-09-21 PROCEDURE — 84443 ASSAY THYROID STIM HORMONE: CPT

## 2020-09-21 PROCEDURE — 85025 COMPLETE CBC W/AUTO DIFF WBC: CPT

## 2020-09-21 PROCEDURE — 36415 COLL VENOUS BLD VENIPUNCTURE: CPT

## 2020-09-21 PROCEDURE — 80061 LIPID PANEL: CPT

## 2020-09-21 PROCEDURE — 80053 COMPREHEN METABOLIC PANEL: CPT

## 2020-09-21 PROCEDURE — 81003 URINALYSIS AUTO W/O SCOPE: CPT

## 2020-09-21 PROCEDURE — 82306 VITAMIN D 25 HYDROXY: CPT

## 2020-09-21 PROCEDURE — 99214 OFFICE O/P EST MOD 30 MIN: CPT | Performed by: PHYSICIAN ASSISTANT

## 2020-09-21 ASSESSMENT — FIBROSIS 4 INDEX: FIB4 SCORE: 0.88

## 2020-09-21 ASSESSMENT — PATIENT HEALTH QUESTIONNAIRE - PHQ9
5. POOR APPETITE OR OVEREATING: 0 - NOT AT ALL
CLINICAL INTERPRETATION OF PHQ2 SCORE: 2
SUM OF ALL RESPONSES TO PHQ QUESTIONS 1-9: 6

## 2020-09-21 NOTE — ASSESSMENT & PLAN NOTE
This is a chronic condition.   Latest Labs:   Lab Results   Component Value Date/Time    CHOLSTRLTOT 199 01/30/2019 09:23 AM     (H) 01/30/2019 09:23 AM    HDL 32 (A) 01/30/2019 09:23 AM    TRIGLYCERIDE 99 01/30/2019 09:23 AM      Medications: none currently  Diet/Exercise: getting back into bowling as exercise. Discussed dietary changes to reduce cholesterol as well.   Family History of high cholesterol or heart disease? none

## 2020-09-21 NOTE — PATIENT INSTRUCTIONS
Your cholesterol is elevated. I recommend decreasing your intake of saturated fats which are found in meats that come from a cow or pig. Saturated fats are also found in creams, cheeses, butter, mayonnaise, and fried foods. I recommend that you try to eat more vegetables, fruits, fish, and healthy oils like olive oil. Increase fiber intake to 35 grams or more a day. If you do not eat a lot of fiber currently, increase fiber slowly over weeks to reduce bloating and abdominal discomfort. I also recommend moderate intensity exercise like a brisk walk. This exercise should last at least 30 minutes and occur 5 or more days per week.

## 2020-09-21 NOTE — PROGRESS NOTES
CC:   Chief Complaint   Patient presents with   • Establish Care   • Gastrophageal Reflux          HISTORY OF PRESENT ILLNESS: Patient is a 40 y.o. male established patient who presents today to establish care with me and discuss the following issues:    Health Maintenance: Completed  Declines vaccinations.     Gastroesophageal reflux disease  Chronic condition. On omeprazole 20 mg daily. Contolled per patient. In past he has had some nausea, maybe atypical GERD, or anxiety induced. Has zofran on hand for this. Last episode in February.     Vitamin D deficiency  This is a chronic condition.   Supplementation: 2000 IU daily  Last Vitamin D level:   VIT D:   Lab Results   Component Value Date/Time    25HYDROXY 9 (L) 01/30/2019 0923     Patient denies any muscle aches or fatigue.       Dyslipidemia  This is a chronic condition.   Latest Labs:   Lab Results   Component Value Date/Time    CHOLSTRLTOT 199 01/30/2019 09:23 AM     (H) 01/30/2019 09:23 AM    HDL 32 (A) 01/30/2019 09:23 AM    TRIGLYCERIDE 99 01/30/2019 09:23 AM      Medications: none currently  Diet/Exercise: getting back into bowling as exercise. Discussed dietary changes to reduce cholesterol as well.   Family History of high cholesterol or heart disease? none         Left lower quadrant abdominal pain  States in the last 4 weeks occasional gurgling and pain in LLQ. Feels that he has to urinate. Woke up at 3AM last night felt that he had to urinate, with some LLQ discomfort. No dysuria with urination, no blood in his urine, no frequency. Had BM as well, slightly loose, no blood in stool. No fever or chills. Abdominal pain resolved currently. Last night for dinner he had chicken Quesidillas, spicy, didn't take Pepcid with it.       Patient Active Problem List    Diagnosis Date Noted   • Vitamin D deficiency 09/21/2020   • Dyslipidemia 09/21/2020   • Left lower quadrant abdominal pain 09/21/2020   • Gastroesophageal reflux disease 01/22/2019   •  "Encounter to establish care with new doctor 01/15/2019   • Wellness examination 01/15/2019      Allergies:Patient has no known allergies.    Current Outpatient Medications   Medication Sig Dispense Refill   • ergocalciferol (DRISDOL) 63736 UNIT capsule Take 1 cap by mouth every day for 10 days, and then take 1 cap by mouth every Tuesday and Saturday until prescription is completed. 16 Cap 0   • ondansetron (ZOFRAN ODT) 4 MG TABLET DISPERSIBLE Take 1 Tab by mouth every 8 hours as needed. 10 Tab 0   • omeprazole (PRILOSEC) 20 MG delayed-release capsule Take 20 mg by mouth every day.       No current facility-administered medications for this visit.        Social History     Tobacco Use   • Smoking status: Never Smoker   • Smokeless tobacco: Never Used   Substance Use Topics   • Alcohol use: No   • Drug use: No     Social History     Social History Narrative   • Not on file       History reviewed. No pertinent family history.    Review of Systems:    Constitutional: No Fevers, Chills  Eyes: No vision changes  ENT: No hearing changes  Resp: No Shortness of breath  CV: No Chest pain  GI: No Nausea/Vomiting  MSK: No weakness  Skin: No rashes  Neuro: No Headaches  Psych: No Suicidal ideations    All remaining systems reviewed and found to be negative, except as stated above.    Exam:    /82   Pulse 81   Temp 36.3 °C (97.3 °F) (Temporal)   Resp 12   Ht 1.803 m (5' 11\")   Wt 118.8 kg (262 lb)   SpO2 96%  Body mass index is 36.54 kg/m².    General:  Well nourished, well developed male in NAD  HENT: Atraumatic, normocephalic  EYES: Extraocular movements intact  NECK: Supple, FROM  CHEST: No deformities, Equal chest expansion  RESP: Unlabored, no stridor or audible wheeze  HEART: Regular Rate and rhythm.   ABD: Soft, Non-Distended, normal active bowel sounds, slight tenderness to palpation over left lower quadrant/suprapubic region.  Extremities: No Clubbing, Cyanosis, or Edema  Skin: Warm/dry, without " magda  Neuro: A/O x 4, due to COVID-19- did not have patient remove face mask to test cranial nerves.  Motor/sensory grossly intact  Psych: Normal behavior, normal affect      Lab review:  Labs are reviewed and discussed with a patient  Lab Results   Component Value Date/Time    CHOLSTRLTOT 199 01/30/2019 09:23 AM     (H) 01/30/2019 09:23 AM    HDL 32 (A) 01/30/2019 09:23 AM    TRIGLYCERIDE 99 01/30/2019 09:23 AM       Lab Results   Component Value Date/Time    SODIUM 142 01/30/2019 09:23 AM    POTASSIUM 3.9 01/30/2019 09:23 AM    CHLORIDE 104 01/30/2019 09:23 AM    CO2 27 01/30/2019 09:23 AM    GLUCOSE 95 01/30/2019 09:23 AM    BUN 14 01/30/2019 09:23 AM    CREATININE 0.93 01/30/2019 09:23 AM     Lab Results   Component Value Date/Time    ALKPHOSPHAT 55 01/30/2019 09:23 AM    ASTSGOT 23 01/30/2019 09:23 AM    ALTSGPT 22 01/30/2019 09:23 AM    TBILIRUBIN 0.8 01/30/2019 09:23 AM      No results found for: HBA1C  No results found for: TSH  No results found for: FREET4    Lab Results   Component Value Date/Time    WBC 6.2 01/30/2019 09:23 AM    RBC 5.67 01/30/2019 09:23 AM    HEMOGLOBIN 16.8 01/30/2019 09:23 AM    HEMATOCRIT 50.3 01/30/2019 09:23 AM    MCV 88.7 01/30/2019 09:23 AM    MCH 29.6 01/30/2019 09:23 AM    MCHC 33.4 (L) 01/30/2019 09:23 AM    MPV 11.0 01/30/2019 09:23 AM    NEUTSPOLYS 65.00 01/30/2019 09:23 AM    LYMPHOCYTES 19.70 (L) 01/30/2019 09:23 AM    MONOCYTES 11.30 01/30/2019 09:23 AM    EOSINOPHILS 3.10 01/30/2019 09:23 AM    BASOPHILS 0.60 01/30/2019 09:23 AM        Assessment/Plan:  1. Gastroesophageal reflux disease, esophagitis presence not specified  Chronic condition, continue omeprazole 20 mg daily.  2. Vitamin D deficiency  - VITAMIN D,25 HYDROXY; Future  Vitamin D quite low on last set of labs, due for updated labs, continue 2000 IUs daily.  3. Dyslipidemia  - Lipid Profile; Future  LDL moderately elevated on last set of labs from 2019, discussed lifestyle modifications today.  Will  repeat labs now.  4. Left lower quadrant abdominal pain  - URINALYSIS,CULTURE IF INDICATED; Future  Suspect abdominal discomfort could be atypical reflux.  He did have spicy quesadilla last night and usually takes Pepcid when he has spicy foods to help decrease flareups.  No obvious signs of infectious process the gastrointestinal tract order cystitis.  We will get a urinalysis and labs of further work-up.  Hold on imaging for now.  Would recommend controlling reflux symptoms first.  If pain reoccurs then recommend a follow-up immediately to readdress.  5. General medical exam  - CBC WITH DIFFERENTIAL; Future  - Comp Metabolic Panel; Future  - TSH; Future       Follow-up: Return in about 4 weeks (around 10/19/2020) for Follow up on labs.    Please note that this dictation was created using voice recognition software. I have made every reasonable attempt to correct obvious errors, but I expect that there are errors of grammar and possibly content that I did not discover before finalizing the note.

## 2020-09-21 NOTE — ASSESSMENT & PLAN NOTE
States in the last 4 weeks occasional gurgling and pain in LLQ. Feels that he has to urinate. Woke up at 3AM last night felt that he had to urinate, with some LLQ discomfort. No dysuria with urination, no blood in his urine, no frequency. Had BM as well, slightly loose, no blood in stool. No fever or chills. Abdominal pain resolved currently. Last night for dinner he had chicken Quesidillas, spicy, didn't take Pepcid with it.

## 2020-09-21 NOTE — ASSESSMENT & PLAN NOTE
Chronic condition. On omeprazole 20 mg daily. Contolled per patient. In past he has had some nausea, maybe atypical GERD, or anxiety induced. Has zofran on hand for this. Last episode in February.

## 2020-09-21 NOTE — ASSESSMENT & PLAN NOTE
This is a chronic condition.   Supplementation: 2000 IU daily  Last Vitamin D level:   VIT D:   Lab Results   Component Value Date/Time    25HYDROXY 9 (L) 01/30/2019 0923     Patient denies any muscle aches or fatigue.

## 2020-09-22 ENCOUNTER — TELEPHONE (OUTPATIENT)
Dept: MEDICAL GROUP | Facility: PHYSICIAN GROUP | Age: 40
End: 2020-09-22

## 2020-09-22 NOTE — TELEPHONE ENCOUNTER
----- Message from Nelia Peñaloza P.A.-C. sent at 9/22/2020 10:45 AM PDT -----  Please call patient about their results.     Results showed: Urinalysis is normal, no signs of infection, this is good news.     Vitamin D is low.  Please start supplementing vitamin D 2000 IUs daily.    Your cholesterol is elevated. I recommend decreasing your intake of saturated fats which are found in meats that come from a cow or pig. Saturated fats are also found in creams, cheeses, butter, mayonnaise, and fried foods. I recommend that you try to eat more vegetables, fruits, fish, and healthy oils like olive oil. Increase fiber intake to 35 grams or more a day. If you do not eat a lot of fiber currently, increase fiber slowly over weeks to reduce bloating and abdominal discomfort. I also recommend moderate intensity exercise like a brisk walk. This exercise should last at least 30 minutes and occur 5 or more days per week.     The rest your labs are normal.    We will discuss labs in full detail at follow up visit.     If you have any questions or concerns, do not hesitate to contact me or my Medical Assistant. Thank you for your time today.     Respectfully,     Nelia Peñaloza PA-C

## 2020-10-21 ENCOUNTER — OFFICE VISIT (OUTPATIENT)
Dept: MEDICAL GROUP | Facility: PHYSICIAN GROUP | Age: 40
End: 2020-10-21
Payer: COMMERCIAL

## 2020-10-21 VITALS
OXYGEN SATURATION: 94 % | RESPIRATION RATE: 14 BRPM | TEMPERATURE: 97.3 F | DIASTOLIC BLOOD PRESSURE: 76 MMHG | HEIGHT: 71 IN | HEART RATE: 63 BPM | SYSTOLIC BLOOD PRESSURE: 104 MMHG | WEIGHT: 262 LBS | BODY MASS INDEX: 36.68 KG/M2

## 2020-10-21 DIAGNOSIS — E55.9 VITAMIN D DEFICIENCY: ICD-10-CM

## 2020-10-21 DIAGNOSIS — E78.5 DYSLIPIDEMIA: ICD-10-CM

## 2020-10-21 DIAGNOSIS — K21.9 GASTROESOPHAGEAL REFLUX DISEASE, UNSPECIFIED WHETHER ESOPHAGITIS PRESENT: ICD-10-CM

## 2020-10-21 PROCEDURE — 99213 OFFICE O/P EST LOW 20 MIN: CPT | Performed by: PHYSICIAN ASSISTANT

## 2020-10-21 RX ORDER — OMEPRAZOLE 20 MG/1
20 CAPSULE, DELAYED RELEASE ORAL 2 TIMES DAILY
Qty: 60 CAP | Refills: 2 | Status: SHIPPED | OUTPATIENT
Start: 2020-10-21 | End: 2021-04-12 | Stop reason: SDUPTHER

## 2020-10-21 ASSESSMENT — FIBROSIS 4 INDEX: FIB4 SCORE: 0.75

## 2020-10-21 NOTE — ASSESSMENT & PLAN NOTE
This is a chronic condition.   Latest Labs:   Lab Results   Component Value Date/Time    CHOLSTRLTOT 234 (H) 09/21/2020 09:55 AM     (H) 09/21/2020 09:55 AM    HDL 38 (A) 09/21/2020 09:55 AM    TRIGLYCERIDE 163 (H) 09/21/2020 09:55 AM      Medications: Discussed medications vs lifestyle modifications. He'd like to try lifestyle modifications first before trying medications.   Diet/Exercise: working on diet, started fiber supplement.   Family History of high cholesterol or heart disease? none

## 2020-10-21 NOTE — ASSESSMENT & PLAN NOTE
Some reflux breakthrough recently. He's overall been avoiding triggers. Discussed increasing omeprazole. More stress than normal.

## 2020-10-21 NOTE — PROGRESS NOTES
CC:   Chief Complaint   Patient presents with   • Lab Results   • Gastrophageal Reflux          HISTORY OF PRESENT ILLNESS: Patient is a 40 y.o. male established patient who presents today to follow up on labs     Health Maintenance: Completed    Vitamin D deficiency  This is a  chronic condition.   Supplementation: yes, he's unsure dosage, recommend 2000 IU   Last Vitamin D level:   VIT D:   Lab Results   Component Value Date/Time    25HYDROXY 22 (L) 09/21/2020 0955     Patient denies any muscle aches or fatigue.   '  Big improvement from 2019.       Dyslipidemia  This is a chronic condition.   Latest Labs:   Lab Results   Component Value Date/Time    CHOLSTRLTOT 234 (H) 09/21/2020 09:55 AM     (H) 09/21/2020 09:55 AM    HDL 38 (A) 09/21/2020 09:55 AM    TRIGLYCERIDE 163 (H) 09/21/2020 09:55 AM      Medications: Discussed medications vs lifestyle modifications. He'd like to try lifestyle modifications first before trying medications.   Diet/Exercise: working on diet, started fiber supplement.   Family History of high cholesterol or heart disease? none    Gastroesophageal reflux disease  Some reflux breakthrough recently. He's overall been avoiding triggers. Discussed increasing omeprazole. More stress than normal.       Patient Active Problem List    Diagnosis Date Noted   • Vitamin D deficiency 09/21/2020   • Dyslipidemia 09/21/2020   • Left lower quadrant abdominal pain 09/21/2020   • Gastroesophageal reflux disease 01/22/2019   • Encounter to establish care with new doctor 01/15/2019   • Wellness examination 01/15/2019      Allergies:Patient has no known allergies.    Current Outpatient Medications   Medication Sig Dispense Refill   • omeprazole (PRILOSEC) 20 MG delayed-release capsule Take 1 Cap by mouth 2 times a day. 60 Cap 2   • ondansetron (ZOFRAN ODT) 4 MG TABLET DISPERSIBLE Take 1 Tab by mouth every 8 hours as needed. 10 Tab 0   • ergocalciferol (DRISDOL) 47839 UNIT capsule Take 1 cap by mouth  "every day for 10 days, and then take 1 cap by mouth every Tuesday and Saturday until prescription is completed. 16 Cap 0     No current facility-administered medications for this visit.        Social History     Tobacco Use   • Smoking status: Never Smoker   • Smokeless tobacco: Never Used   Substance Use Topics   • Alcohol use: No   • Drug use: No     Social History     Social History Narrative   • Not on file       No family history on file.    Review of Systems:    Constitutional: No Fevers, Chills  Eyes: No vision changes  ENT: No hearing changes  Resp: No Shortness of breath  CV: No Chest pain  GI: No Nausea/Vomiting  MSK: No weakness  Skin: No rashes  Neuro: No Headaches  Psych: No Suicidal ideations    All remaining systems reviewed and found to be negative, except as stated above.    Exam:    /76   Pulse 63   Temp 36.3 °C (97.3 °F) (Temporal)   Resp 14   Ht 1.803 m (5' 11\")   Wt 118.8 kg (262 lb)   SpO2 94%  Body mass index is 36.54 kg/m².    General:  Well nourished, well developed male in NAD  HENT: Atraumatic, normocephalic  EYES: Extraocular movements intact  NECK: Supple, FROM  CHEST: No deformities, Equal chest expansion  RESP: Unlabored, no stridor or audible wheeze  HEART: Regular Rate and rhythm.   Extremities: No Clubbing, Cyanosis, or Edema  Skin: Warm/dry, without rashes  Neuro: A/O x 4, due to COVID-19- did not have patient remove face mask to test cranial nerves.  Motor/sensory grossly intact  Psych: Normal behavior, normal affect      Lab review:  Labs are reviewed and discussed with a patient  Lab Results   Component Value Date/Time    CHOLSTRLTOT 234 (H) 09/21/2020 09:55 AM     (H) 09/21/2020 09:55 AM    HDL 38 (A) 09/21/2020 09:55 AM    TRIGLYCERIDE 163 (H) 09/21/2020 09:55 AM       Lab Results   Component Value Date/Time    SODIUM 139 09/21/2020 09:55 AM    POTASSIUM 4.6 09/21/2020 09:55 AM    CHLORIDE 103 09/21/2020 09:55 AM    CO2 24 09/21/2020 09:55 AM    GLUCOSE 99 " 09/21/2020 09:55 AM    BUN 16 09/21/2020 09:55 AM    CREATININE 0.83 09/21/2020 09:55 AM     Lab Results   Component Value Date/Time    ALKPHOSPHAT 70 09/21/2020 09:55 AM    ASTSGOT 22 09/21/2020 09:55 AM    ALTSGPT 31 09/21/2020 09:55 AM    TBILIRUBIN 0.3 09/21/2020 09:55 AM      No results found for: HBA1C  No results found for: TSH  No results found for: FREET4    Lab Results   Component Value Date/Time    WBC 5.6 09/21/2020 09:55 AM    RBC 5.53 09/21/2020 09:55 AM    HEMOGLOBIN 16.4 09/21/2020 09:55 AM    HEMATOCRIT 48.7 09/21/2020 09:55 AM    MCV 88.1 09/21/2020 09:55 AM    MCH 29.7 09/21/2020 09:55 AM    MCHC 33.7 09/21/2020 09:55 AM    MPV 11.4 09/21/2020 09:55 AM    NEUTSPOLYS 56.20 09/21/2020 09:55 AM    LYMPHOCYTES 29.00 09/21/2020 09:55 AM    MONOCYTES 10.90 09/21/2020 09:55 AM    EOSINOPHILS 2.50 09/21/2020 09:55 AM    BASOPHILS 0.90 09/21/2020 09:55 AM          Assessment/Plan:  1. Vitamin D deficiency  Continue vitamin D supplementation, recommend 2000 IUs a day we will treat recheck in a year.  Vitamin D overall greatly improved from 2019 labs.    2. Dyslipidemia  - LipoFit by NMR; Future  - CRP HIGH SENSITIVE (CARDIAC); Future  Chronic condition, discussed lifestyle modifications versus medication today, patient would like to try lifestyle modifications for next 90 days.  Discussed what foods to avoid, increasing cardio exercise.  Also I had like to try to get an NMR and CRP with his next set of labs to give us more objective data on how aggressive we need to be getting his cholesterol down.  No family history of heart disease or high cholesterol.  Also offered if he would like to get a CT calcium score at any time we can order this as well.    3. Gastroesophageal reflux disease, unspecified whether esophagitis present  Chronic condition, relatively uncontrolled at this point, recommend we increase the omeprazole to twice a day.  He is avoiding food triggers at this time but does have increased rest  in his life.      Other orders  - omeprazole (PRILOSEC) 20 MG delayed-release capsule; Take 1 Cap by mouth 2 times a day.  Dispense: 60 Cap; Refill: 2       Follow-up: Return in about 3 months (around 1/21/2021) for Follow up on labs.    Please note that this dictation was created using voice recognition software. I have made every reasonable attempt to correct obvious errors, but I expect that there are errors of grammar and possibly content that I did not discover before finalizing the note.

## 2020-10-21 NOTE — ASSESSMENT & PLAN NOTE
This is a  chronic condition.   Supplementation: yes, he's unsure dosage, recommend 2000 IU   Last Vitamin D level:   VIT D:   Lab Results   Component Value Date/Time    25HYDROXY 22 (L) 09/21/2020 0955     Patient denies any muscle aches or fatigue.   '  Big improvement from 2019.

## 2020-11-18 ENCOUNTER — OFFICE VISIT (OUTPATIENT)
Dept: URGENT CARE | Facility: PHYSICIAN GROUP | Age: 40
End: 2020-11-18
Payer: COMMERCIAL

## 2020-11-18 VITALS
HEART RATE: 72 BPM | SYSTOLIC BLOOD PRESSURE: 120 MMHG | HEIGHT: 71 IN | BODY MASS INDEX: 35.56 KG/M2 | OXYGEN SATURATION: 96 % | RESPIRATION RATE: 16 BRPM | DIASTOLIC BLOOD PRESSURE: 76 MMHG | WEIGHT: 254 LBS | TEMPERATURE: 97.7 F

## 2020-11-18 DIAGNOSIS — R10.30 LOWER ABDOMINAL PAIN: ICD-10-CM

## 2020-11-18 LAB
APPEARANCE UR: CLEAR
BILIRUB UR STRIP-MCNC: NEGATIVE MG/DL
COLOR UR AUTO: YELLOW
GLUCOSE UR STRIP.AUTO-MCNC: NEGATIVE MG/DL
KETONES UR STRIP.AUTO-MCNC: NEGATIVE MG/DL
LEUKOCYTE ESTERASE UR QL STRIP.AUTO: NEGATIVE
NITRITE UR QL STRIP.AUTO: NEGATIVE
PH UR STRIP.AUTO: 7 [PH] (ref 5–8)
PROT UR QL STRIP: NEGATIVE MG/DL
RBC UR QL AUTO: NORMAL
SP GR UR STRIP.AUTO: 1.03
UROBILINOGEN UR STRIP-MCNC: NORMAL MG/DL

## 2020-11-18 PROCEDURE — 81002 URINALYSIS NONAUTO W/O SCOPE: CPT | Performed by: NURSE PRACTITIONER

## 2020-11-18 PROCEDURE — 99214 OFFICE O/P EST MOD 30 MIN: CPT | Performed by: NURSE PRACTITIONER

## 2020-11-18 ASSESSMENT — ENCOUNTER SYMPTOMS
WHEEZING: 0
FEVER: 0
PALPITATIONS: 0
VOMITING: 0
NAUSEA: 0
SHORTNESS OF BREATH: 0
DIARRHEA: 0
BELCHING: 0
MEMORY LOSS: 0
DIZZINESS: 0
TINGLING: 0
MYALGIAS: 0
ORTHOPNEA: 0
SORE THROAT: 0
HEADACHES: 0
ANOREXIA: 0
ABDOMINAL PAIN: 1
CHILLS: 0
FLATUS: 1
FLANK PAIN: 0
HEMATOCHEZIA: 0
ARTHRALGIAS: 0
CONSTIPATION: 1
HEARTBURN: 0
BACK PAIN: 0
COUGH: 0

## 2020-11-18 ASSESSMENT — CROHNS DISEASE ACTIVITY INDEX (CDAI): CDAI SCORE: 0

## 2020-11-18 ASSESSMENT — FIBROSIS 4 INDEX: FIB4 SCORE: 0.75

## 2020-11-18 NOTE — PROGRESS NOTES
Subjective:      William Carroll is a 40 y.o. male who presents with Other (abd discomfort pt states he may not be voiding stool completely x 2 days)            Abdominal Pain  This is a new problem. Episode onset: In the past 3 days. The onset quality is gradual. The problem occurs constantly. The problem has been unchanged. The pain is located in the suprapubic region. The pain is at a severity of 4/10. The pain is mild. The quality of the pain is aching and dull. The abdominal pain does not radiate. Associated symptoms include constipation and flatus. Pertinent negatives include no anorexia, arthralgias, belching, diarrhea, dysuria, fever, frequency, headaches, hematochezia, hematuria, melena, myalgias, nausea or vomiting. Nothing aggravates the pain. The pain is relieved by nothing. He has tried proton pump inhibitors (Colace) for the symptoms. The treatment provided mild relief. His past medical history is significant for GERD. There is no history of colon cancer, Crohn's disease or irritable bowel syndrome.   Last BM 2 days ago.  Patient has recently started taking fiber supplements      Review of Systems   Constitutional: Negative for chills, fever and malaise/fatigue.   HENT: Negative for ear pain and sore throat.    Respiratory: Negative for cough, shortness of breath and wheezing.    Cardiovascular: Negative for chest pain, palpitations, orthopnea and leg swelling.   Gastrointestinal: Positive for abdominal pain, constipation and flatus. Negative for anorexia, diarrhea, heartburn, hematochezia, melena, nausea and vomiting.   Genitourinary: Negative for dysuria, flank pain, frequency, hematuria and urgency.   Musculoskeletal: Negative for arthralgias, back pain, joint pain and myalgias.   Skin: Negative for rash.   Neurological: Negative for dizziness, tingling and headaches.   Psychiatric/Behavioral: Negative for memory loss and suicidal ideas.   All other systems reviewed and are negative.          "Objective:     /76 (BP Location: Left arm, Patient Position: Sitting, BP Cuff Size: Adult)   Pulse 72   Temp 36.5 °C (97.7 °F) (Temporal)   Resp 16   Ht 1.803 m (5' 11\")   Wt 115.2 kg (254 lb)   SpO2 96%   BMI 35.43 kg/m²      Physical Exam  Vitals signs reviewed.   Constitutional:       General: He is not in acute distress.     Appearance: Normal appearance. He is not ill-appearing, toxic-appearing or diaphoretic.   HENT:      Head: Normocephalic.      Right Ear: External ear normal.      Left Ear: External ear normal.      Nose: Nose normal. No congestion.      Mouth/Throat:      Mouth: Mucous membranes are moist.   Eyes:      Extraocular Movements: Extraocular movements intact.      Conjunctiva/sclera: Conjunctivae normal.   Neck:      Musculoskeletal: Normal range of motion.   Cardiovascular:      Rate and Rhythm: Normal rate and regular rhythm.      Pulses: Normal pulses.      Heart sounds: Normal heart sounds. No murmur.   Pulmonary:      Effort: Pulmonary effort is normal.      Breath sounds: Normal breath sounds. No wheezing.   Abdominal:      General: Abdomen is protuberant. Bowel sounds are normal. There is no distension or abdominal bruit.      Palpations: Abdomen is soft. There is no mass.      Tenderness: There is abdominal tenderness in the suprapubic area. There is no right CVA tenderness, left CVA tenderness, guarding or rebound. Negative signs include Lanier's sign, Rovsing's sign, McBurney's sign, psoas sign and obturator sign.      Hernia: No hernia is present.   Musculoskeletal: Normal range of motion.   Lymphadenopathy:      Cervical: No cervical adenopathy.   Skin:     General: Skin is warm and dry.      Capillary Refill: Capillary refill takes less than 2 seconds.   Neurological:      Mental Status: He is alert and oriented to person, place, and time.   Psychiatric:         Mood and Affect: Mood normal.         Behavior: Behavior normal.                    Assessment/Plan:      "   1. Lower abdominal pain  POCT Urinalysis     Differential diagnosis, natural history, supportive care, and indications for immediate follow-up discussed at length.     Discussed differentials including nephrolithiasis, increased fiber intake, vs constipation. Patient is in no acute distress. No red flags. Symptoms are very mild in nature. SDM regarding CT renal colic. Patient feels symptoms are mild and at this time would like to see how conservative measures including increasing bowel protocol affects symptoms. Will hold off on CT for now.     PT can take over the counter meds as follows:  • Stool softener (docusate sodium 100mg) one capsule 2 times daily.  • Miralax 1 capful 1-2 times daily until stools become soft.   • Milk of magnesia 4 Tbs once by mouth followed by 20-40 oz of water daily.   • Glycerin rectal suppository if no relief from above.     PT can use these medications until constipation resolves.      Plan of care, medications and treatments reviewed with patient and or guardian.  Patient and or guardian voices understanding and agrees with the instructions provided. After visit summary reviewed with patient. Patient and or guardian understand the parameters for reevaluation and ER precautions discussed.     Follow up with primary care provider in the next 1-5 days for recheck as needed.  Discussed that urgent care setting has limited resources, therefore any worsening of symptoms should be evaluated in the ER. Patient and or guardian verbalized understanding.     Please note that this dictation was created using voice recognition software. I have made every reasonable attempt to correct obvious errors, but I expect that there are errors of grammar and possibly content that I did not discover before finalizing the note.

## 2021-01-21 ENCOUNTER — APPOINTMENT (OUTPATIENT)
Dept: LAB | Facility: MEDICAL CENTER | Age: 41
End: 2021-01-21
Payer: COMMERCIAL

## 2021-03-11 ENCOUNTER — TELEPHONE (OUTPATIENT)
Dept: MEDICAL GROUP | Facility: PHYSICIAN GROUP | Age: 41
End: 2021-03-11

## 2021-03-11 DIAGNOSIS — Z11.59 SCREENING FOR VIRAL DISEASE: ICD-10-CM

## 2021-03-11 NOTE — TELEPHONE ENCOUNTER
William called into clinic.    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    Please sign or advise other wise

## 2021-03-30 ENCOUNTER — HOSPITAL ENCOUNTER (OUTPATIENT)
Dept: LAB | Facility: MEDICAL CENTER | Age: 41
End: 2021-03-30
Attending: PHYSICIAN ASSISTANT
Payer: COMMERCIAL

## 2021-03-30 DIAGNOSIS — E78.5 DYSLIPIDEMIA: ICD-10-CM

## 2021-03-30 DIAGNOSIS — Z11.59 SCREENING FOR VIRAL DISEASE: ICD-10-CM

## 2021-03-30 PROCEDURE — 80061 LIPID PANEL: CPT

## 2021-03-30 PROCEDURE — 36415 COLL VENOUS BLD VENIPUNCTURE: CPT

## 2021-03-30 PROCEDURE — U0005 INFEC AGEN DETEC AMPLI PROBE: HCPCS

## 2021-03-30 PROCEDURE — U0003 INFECTIOUS AGENT DETECTION BY NUCLEIC ACID (DNA OR RNA); SEVERE ACUTE RESPIRATORY SYNDROME CORONAVIRUS 2 (SARS-COV-2) (CORONAVIRUS DISEASE [COVID-19]), AMPLIFIED PROBE TECHNIQUE, MAKING USE OF HIGH THROUGHPUT TECHNOLOGIES AS DESCRIBED BY CMS-2020-01-R: HCPCS

## 2021-03-30 PROCEDURE — C9803 HOPD COVID-19 SPEC COLLECT: HCPCS

## 2021-03-30 PROCEDURE — 86141 C-REACTIVE PROTEIN HS: CPT

## 2021-03-30 PROCEDURE — 83704 LIPOPROTEIN BLD QUAN PART: CPT

## 2021-03-31 LAB
CRP SERPL HS-MCNC: 4.7 MG/L (ref 0–7.5)
SARS-COV-2 RNA RESP QL NAA+PROBE: NOTDETECTED
SPECIMEN SOURCE: NORMAL

## 2021-04-04 LAB
CHOLEST SERPL-MCNC: 204 MG/DL
FASTING STATUS PATIENT QL REPORTED: NORMAL
HDL PARTICAL NO Q4363: 30.9 UMOL/L
HDL SIZE Q4361: 8.2 NM
HDLC SERPL-MCNC: 32 MG/DL (ref 40–59)
HLD.LARGE SERPL-SCNC: <2.8 UMOL/L
L VLDL PART NO Q4357: 4.9 NMOL/L
LDL SERPL QN: 20.3 NM
LDL SERPL-SCNC: 1885 NMOL/L
LDL SMALL SERPL-SCNC: >1085 NMOL/L
LDLC SERPL CALC-MCNC: 140 MG/DL
PATHOLOGY STUDY: ABNORMAL
TRIGL SERPL-MCNC: 159 MG/DL (ref 30–149)
VLDL SIZE Q4362: 49.9 NM

## 2021-04-05 ENCOUNTER — TELEPHONE (OUTPATIENT)
Dept: MEDICAL GROUP | Facility: PHYSICIAN GROUP | Age: 41
End: 2021-04-05

## 2021-04-05 NOTE — TELEPHONE ENCOUNTER
----- Message from Nelia Peñaloza P.A.-C. sent at 4/5/2021  2:31 PM PDT -----  Please call patient about their labs.     There are a few things on their labs I'd like to talk about. Please schedule a follow up.     Thank you,    Nelia Peñaloza P.A.-C.

## 2021-04-09 NOTE — ASSESSMENT & PLAN NOTE
NMR panel reviewed with patient today.  Total cholesterol 204, triglycerides 159, HDL 32, .  LDL particle count elevated at 1885.  Small LDL high at greater than 1085, patient has a small particle pattern size.  Do recommend more aggressive treatment for the patient.  CRP inflammatory marker normal.

## 2021-04-12 ENCOUNTER — TELEMEDICINE (OUTPATIENT)
Dept: MEDICAL GROUP | Facility: PHYSICIAN GROUP | Age: 41
End: 2021-04-12
Payer: COMMERCIAL

## 2021-04-12 VITALS — BODY MASS INDEX: 35.56 KG/M2 | HEIGHT: 71 IN | TEMPERATURE: 98.2 F | WEIGHT: 254 LBS

## 2021-04-12 DIAGNOSIS — Z79.899 HIGH RISK MEDICATION USE: ICD-10-CM

## 2021-04-12 DIAGNOSIS — R10.32 LEFT LOWER QUADRANT ABDOMINAL PAIN: ICD-10-CM

## 2021-04-12 DIAGNOSIS — K21.9 GASTROESOPHAGEAL REFLUX DISEASE, UNSPECIFIED WHETHER ESOPHAGITIS PRESENT: ICD-10-CM

## 2021-04-12 DIAGNOSIS — E78.5 DYSLIPIDEMIA: ICD-10-CM

## 2021-04-12 PROCEDURE — 99213 OFFICE O/P EST LOW 20 MIN: CPT | Mod: 95,CR | Performed by: PHYSICIAN ASSISTANT

## 2021-04-12 RX ORDER — ROSUVASTATIN CALCIUM 5 MG/1
5 TABLET, COATED ORAL EVERY EVENING
Qty: 30 TABLET | Refills: 11 | Status: SHIPPED | OUTPATIENT
Start: 2021-04-12

## 2021-04-12 RX ORDER — OMEPRAZOLE 20 MG/1
20 CAPSULE, DELAYED RELEASE ORAL 2 TIMES DAILY
Qty: 180 CAPSULE | Refills: 1 | Status: SHIPPED | OUTPATIENT
Start: 2021-04-12 | End: 2021-11-01 | Stop reason: SDUPTHER

## 2021-04-12 ASSESSMENT — FIBROSIS 4 INDEX: FIB4 SCORE: 0.77

## 2021-04-12 ASSESSMENT — PATIENT HEALTH QUESTIONNAIRE - PHQ9: CLINICAL INTERPRETATION OF PHQ2 SCORE: 0

## 2021-04-12 NOTE — PROGRESS NOTES
Virtual Visit: Established Patient   This visit was conducted via Zoom using secure and encrypted videoconferencing technology. The patient was in a private location in the state of [place].    The patient's identity was confirmed and verbal consent was obtained for this virtual visit.    Subjective:   CC:   Chief Complaint   Patient presents with   • Lab Results   • Dyslipidemia       William Carroll is a 41 y.o. male presenting for evaluation and management of:    Dyslipidemia  NMR panel reviewed with patient today.  Total cholesterol 204, triglycerides 159, HDL 32, .  LDL particle count elevated at 1885.  Small LDL high at greater than 1085, patient has a small particle pattern size.  Do recommend more aggressive treatment for the patient.  CRP inflammatory marker normal.    Left lower quadrant abdominal pain  Reviewed urinalysis- negative. He states that since last visit abdominal pain has improved, but occasionally occurs. No urinary symptoms. He's unsure if associated with his diet. No diarrhea or constipation. No nausea or vomiting. No fever or chills.     Gastroesophageal reflux disease  Increasing omeprazole to 20 mg BID and this has been very helpful.       ROS   Denies any recent fevers or chills. No nausea or vomiting. No chest pains or shortness of breath.     No Known Allergies    Current medicines (including changes today)  Current Outpatient Medications   Medication Sig Dispense Refill   • rosuvastatin (CRESTOR) 5 MG Tab Take 1 tablet by mouth every evening. 30 tablet 11   • omeprazole (PRILOSEC) 20 MG delayed-release capsule Take 1 capsule by mouth 2 times a day. 180 capsule 1   • ergocalciferol (DRISDOL) 51071 UNIT capsule Take 1 cap by mouth every day for 10 days, and then take 1 cap by mouth every Tuesday and Saturday until prescription is completed. 16 Cap 0   • ondansetron (ZOFRAN ODT) 4 MG TABLET DISPERSIBLE Take 1 Tab by mouth every 8 hours as needed. 10 Tab 0     No current  "facility-administered medications for this visit.       Patient Active Problem List    Diagnosis Date Noted   • Vitamin D deficiency 09/21/2020   • Dyslipidemia 09/21/2020   • Left lower quadrant abdominal pain 09/21/2020   • Gastroesophageal reflux disease 01/22/2019   • Encounter to establish care with new doctor 01/15/2019   • Wellness examination 01/15/2019       History reviewed. No pertinent family history.    He  has no past medical history on file.  He  has no past surgical history on file.       Objective:   Temp 36.8 °C (98.2 °F) (Oral)   Ht 1.803 m (5' 11\")   Wt 115 kg (254 lb)   BMI 35.43 kg/m²     Physical Exam:  Constitutional: Alert, no distress, well-groomed.  Skin: No rashes in visible areas.  Eye: Round. Conjunctiva clear, lids normal. No icterus.   ENMT: Lips pink without lesions, good dentition, moist mucous membranes. Phonation normal.  Neck: No masses, no thyromegaly. Moves freely without pain.  Respiratory: Unlabored respiratory effort, no cough or audible wheeze  Psych: Alert and oriented x3, normal affect and mood.       Assessment and Plan:   The following treatment plan was discussed:     1. Dyslipidemia  - Lipid Profile; Future  Patient agreeable to trial statin medication.  We will start Crestor 5 mg at night to start.  Also encourage patient to continue to work on lifestyle modifications.  He is already made changes to his diet, he is trying to eat more fiber.  He is an active bowler, we discussed adding routine cardio exercise as well.  Recommend he try to reduce his red meat consumption.  Will repeat labs in 90 days with LFTs.  2. Left lower quadrant abdominal pain  Patient will continue to monitor his abdominal symptoms.  His reflux symptoms are greatly improved since increasing omeprazole to 20 mg twice a day.  3. Gastroesophageal reflux disease, unspecified whether esophagitis present    4. High risk medication use  - HEPATIC FUNCTION PANEL; Future    Other orders  - " rosuvastatin (CRESTOR) 5 MG Tab; Take 1 tablet by mouth every evening.  Dispense: 30 tablet; Refill: 11  - omeprazole (PRILOSEC) 20 MG delayed-release capsule; Take 1 capsule by mouth 2 times a day.  Dispense: 180 capsule; Refill: 1        Follow-up: Return in about 3 months (around 7/12/2021) for Follow up on labs.        The patient verbalized agreement and understanding of current plan. All questions and concerns were addressed at time of visit.    Please note that this dictation was created using voice recognition software. I have made every reasonable attempt to correct obvious errors, but I expect that there are errors of grammar and possibly content that I did not discover before finalizing the note.

## 2021-04-12 NOTE — ASSESSMENT & PLAN NOTE
Reviewed urinalysis- negative. He states that since last visit abdominal pain has improved, but occasionally occurs. No urinary symptoms. He's unsure if associated with his diet. No diarrhea or constipation. No nausea or vomiting. No fever or chills.

## 2021-04-13 ENCOUNTER — IMMUNIZATION (OUTPATIENT)
Dept: FAMILY PLANNING/WOMEN'S HEALTH CLINIC | Facility: IMMUNIZATION CENTER | Age: 41
End: 2021-04-13
Payer: COMMERCIAL

## 2021-04-13 DIAGNOSIS — Z23 ENCOUNTER FOR VACCINATION: Primary | ICD-10-CM

## 2021-04-13 PROCEDURE — 0001A PFIZER SARS-COV-2 VACCINE: CPT | Performed by: INTERNAL MEDICINE

## 2021-04-13 PROCEDURE — 91300 PFIZER SARS-COV-2 VACCINE: CPT | Performed by: INTERNAL MEDICINE

## 2021-04-13 RX ORDER — OMEPRAZOLE 20 MG/1
CAPSULE, DELAYED RELEASE ORAL
Refills: 2 | OUTPATIENT
Start: 2021-04-13

## 2021-05-07 ENCOUNTER — IMMUNIZATION (OUTPATIENT)
Dept: FAMILY PLANNING/WOMEN'S HEALTH CLINIC | Facility: IMMUNIZATION CENTER | Age: 41
End: 2021-05-07
Payer: COMMERCIAL

## 2021-05-07 DIAGNOSIS — Z23 ENCOUNTER FOR VACCINATION: Primary | ICD-10-CM

## 2021-05-07 PROCEDURE — 0002A PFIZER SARS-COV-2 VACCINE: CPT

## 2021-05-07 PROCEDURE — 91300 PFIZER SARS-COV-2 VACCINE: CPT

## 2021-11-01 RX ORDER — OMEPRAZOLE 20 MG/1
20 CAPSULE, DELAYED RELEASE ORAL 2 TIMES DAILY
Qty: 180 CAPSULE | Refills: 1 | Status: SHIPPED | OUTPATIENT
Start: 2021-11-01

## 2022-06-12 ENCOUNTER — OFFICE VISIT (OUTPATIENT)
Dept: URGENT CARE | Facility: PHYSICIAN GROUP | Age: 42
End: 2022-06-12
Payer: COMMERCIAL

## 2022-06-12 VITALS
RESPIRATION RATE: 18 BRPM | SYSTOLIC BLOOD PRESSURE: 122 MMHG | BODY MASS INDEX: 34.93 KG/M2 | HEART RATE: 70 BPM | OXYGEN SATURATION: 95 % | WEIGHT: 244 LBS | TEMPERATURE: 97.7 F | HEIGHT: 70 IN | DIASTOLIC BLOOD PRESSURE: 82 MMHG

## 2022-06-12 DIAGNOSIS — L50.9 URTICARIA: ICD-10-CM

## 2022-06-12 PROCEDURE — 99213 OFFICE O/P EST LOW 20 MIN: CPT | Performed by: FAMILY MEDICINE

## 2022-06-12 RX ORDER — METHYLPREDNISOLONE 4 MG/1
4 TABLET ORAL DAILY
Qty: 21 TABLET | Refills: 0 | Status: SHIPPED | OUTPATIENT
Start: 2022-06-12

## 2022-06-12 RX ORDER — METHYLPREDNISOLONE SODIUM SUCCINATE 125 MG/2ML
125 INJECTION, POWDER, LYOPHILIZED, FOR SOLUTION INTRAMUSCULAR; INTRAVENOUS ONCE
Status: DISCONTINUED | OUTPATIENT
Start: 2022-06-12 | End: 2022-06-12

## 2022-06-12 RX ORDER — METHYLPREDNISOLONE SODIUM SUCCINATE 125 MG/2ML
125 INJECTION, POWDER, LYOPHILIZED, FOR SOLUTION INTRAMUSCULAR; INTRAVENOUS ONCE
Status: COMPLETED | OUTPATIENT
Start: 2022-06-12 | End: 2022-06-12

## 2022-06-12 RX ORDER — LORATADINE 10 MG/1
10 CAPSULE, LIQUID FILLED ORAL DAILY
Qty: 30 CAPSULE | Refills: 1 | Status: SHIPPED | OUTPATIENT
Start: 2022-06-12

## 2022-06-12 RX ADMIN — METHYLPREDNISOLONE SODIUM SUCCINATE 125 MG: 125 INJECTION, POWDER, LYOPHILIZED, FOR SOLUTION INTRAMUSCULAR; INTRAVENOUS at 17:23

## 2022-06-12 ASSESSMENT — ENCOUNTER SYMPTOMS
FEVER: 0
NAUSEA: 0
SORE THROAT: 0
SHORTNESS OF BREATH: 0
MYALGIAS: 0
DIZZINESS: 0
COUGH: 0
CHILLS: 0
VOMITING: 0

## 2022-06-12 ASSESSMENT — FIBROSIS 4 INDEX: FIB4 SCORE: 0.79

## 2022-06-13 NOTE — PROGRESS NOTES
Subjective:   William Carroll is a 42 y.o. male who presents for Rash (Started x1 day ago all over body)        Rash  This is a new (Reports diffuse itchy red rash, onset yesterday, worsening the last few hours with rapid spreading and more intense and worsening itching) problem. The current episode started yesterday. The problem has been gradually worsening since onset. The rash is diffuse. The rash is characterized by itchiness, redness and swelling. Associated with: Environmental exposure to pollen and allergens noted, recently working in garage moving boxes. Pertinent negatives include no cough, fever, shortness of breath, sore throat or vomiting. Past treatments include topical steroids (Diphenhydramine, topical steroids). The treatment provided no relief.     PMH:  has no past medical history on file.  MEDS:   Current Outpatient Medications:   •  methylPREDNISolone (MEDROL DOSEPAK) 4 MG Tablet Therapy Pack, Take 1 Tablet by mouth every day. Follow schedule on package instructions., Disp: 21 Tablet, Rfl: 0  •  Loratadine (CLARITIN) 10 MG Cap, Take 10 mg by mouth every day., Disp: 30 Capsule, Rfl: 1  •  ondansetron (ZOFRAN ODT) 4 MG TABLET DISPERSIBLE, Take 1 Tab by mouth every 8 hours as needed., Disp: 10 Tab, Rfl: 0  •  omeprazole (PRILOSEC) 20 MG delayed-release capsule, Take 1 Capsule by mouth 2 times a day. (Patient not taking: Reported on 6/12/2022), Disp: 180 Capsule, Rfl: 1  •  rosuvastatin (CRESTOR) 5 MG Tab, Take 1 tablet by mouth every evening. (Patient not taking: Reported on 6/12/2022), Disp: 30 tablet, Rfl: 11  •  ergocalciferol (DRISDOL) 09631 UNIT capsule, Take 1 cap by mouth every day for 10 days, and then take 1 cap by mouth every Tuesday and Saturday until prescription is completed. (Patient not taking: Reported on 6/12/2022), Disp: 16 Cap, Rfl: 0    Current Facility-Administered Medications:   •  methylPREDNISolone sod succ (SOLU-MEDROL) 125 MG injection 125 mg, 125 mg, Intravenous, Once,  "Phu Sheikh M.D.  ALLERGIES: No Known Allergies  SURGHX: History reviewed. No pertinent surgical history.  SOCHX:  reports that he has never smoked. He has never used smokeless tobacco. He reports that he does not drink alcohol and does not use drugs.  FH: History reviewed. No pertinent family history.  Review of Systems   Constitutional: Negative for chills and fever.   HENT: Negative for sore throat.    Respiratory: Negative for cough and shortness of breath.    Gastrointestinal: Negative for nausea and vomiting.   Musculoskeletal: Negative for myalgias.   Skin: Positive for itching and rash.   Neurological: Negative for dizziness.        Objective:   /82 (BP Location: Right arm, Patient Position: Sitting, BP Cuff Size: Adult)   Pulse 70   Temp 36.5 °C (97.7 °F) (Temporal)   Resp 18   Ht 1.778 m (5' 10\")   Wt 111 kg (244 lb)   SpO2 95%   BMI 35.01 kg/m²   Physical Exam  Vitals and nursing note reviewed.   Constitutional:       General: He is not in acute distress.     Appearance: He is well-developed.   HENT:      Head: Normocephalic and atraumatic.      Right Ear: External ear normal.      Left Ear: External ear normal.      Nose: Nose normal.      Mouth/Throat:      Mouth: Mucous membranes are moist. No angioedema.      Pharynx: No pharyngeal swelling, posterior oropharyngeal erythema or uvula swelling.   Eyes:      Conjunctiva/sclera: Conjunctivae normal.   Cardiovascular:      Rate and Rhythm: Normal rate.   Pulmonary:      Effort: Pulmonary effort is normal. No respiratory distress.      Breath sounds: Normal breath sounds. No decreased breath sounds or wheezing.      Comments: Speaking full sentences  Abdominal:      General: There is no distension.   Musculoskeletal:         General: Normal range of motion.   Skin:     General: Skin is warm and dry.      Findings: Rash present. Rash is urticarial (Diffuse urticarial rash ).   Neurological:      General: No focal deficit present.      Mental " Status: He is alert and oriented to person, place, and time. Mental status is at baseline.      Gait: Gait (gait at baseline) normal.   Psychiatric:         Judgment: Judgment normal.           Assessment/Plan:   1. Urticaria  - methylPREDNISolone sod succ (SOLU-MEDROL) 125 MG injection 125 mg  - methylPREDNISolone (MEDROL DOSEPAK) 4 MG Tablet Therapy Pack; Take 1 Tablet by mouth every day. Follow schedule on package instructions.  Dispense: 21 Tablet; Refill: 0  - Loratadine (CLARITIN) 10 MG Cap; Take 10 mg by mouth every day.  Dispense: 30 Capsule; Refill: 1        Medical Decision Making/Course:  In the course of preparing for this visit with review of the pertinent past medical history, recent and past clinic visits, current medications, and performing chart, immunization, medical history and medication reconciliation, and in the further course of obtaining the current history pertinent to the clinic visit today, performing an exam and evaluation, ordering and independently evaluating labs, tests  , and/or procedures, prescribing any recommended new medications as noted above including administration of Solu-Medrol 125 mg intramuscular once during urgent care course and recommendations for allergen exposure avoidance, providing any pertinent counseling and education and recommending further coordination of care, at least 22 minutes of total time were spent during this encounter.      Discussed close monitoring, return precautions, and supportive measures of maintaining adequate fluid hydration and caloric intake, relative rest and symptom management as needed for pain and/or fever.    Differential diagnosis, natural history, supportive care, and indications for immediate follow-up discussed.     Advised the patient to follow-up with the primary care physician for recheck, reevaluation, and consideration of further management.    Please note that this dictation was created using voice recognition software. I have  worked with consultants from the vendor as well as technical experts from [facility] to optimize the interface. I have made every reasonable attempt to correct obvious errors, but I expect that there are errors of grammar and possibly content that I did not discover before finalizing the note.

## 2024-10-24 ENCOUNTER — OFFICE VISIT (OUTPATIENT)
Dept: URGENT CARE | Facility: PHYSICIAN GROUP | Age: 44
End: 2024-10-24
Payer: COMMERCIAL

## 2024-10-24 VITALS
HEART RATE: 87 BPM | OXYGEN SATURATION: 94 % | DIASTOLIC BLOOD PRESSURE: 68 MMHG | BODY MASS INDEX: 37.15 KG/M2 | RESPIRATION RATE: 15 BRPM | TEMPERATURE: 97 F | WEIGHT: 265.4 LBS | SYSTOLIC BLOOD PRESSURE: 122 MMHG | HEIGHT: 71 IN

## 2024-10-24 DIAGNOSIS — J98.8 RTI (RESPIRATORY TRACT INFECTION): ICD-10-CM

## 2024-10-24 PROCEDURE — 3074F SYST BP LT 130 MM HG: CPT | Performed by: FAMILY MEDICINE

## 2024-10-24 PROCEDURE — 3078F DIAST BP <80 MM HG: CPT | Performed by: FAMILY MEDICINE

## 2024-10-24 PROCEDURE — 99213 OFFICE O/P EST LOW 20 MIN: CPT | Performed by: FAMILY MEDICINE

## 2024-10-24 RX ORDER — DOXYCYCLINE 100 MG/1
100 CAPSULE ORAL 2 TIMES DAILY
Qty: 14 CAPSULE | Refills: 0 | Status: SHIPPED | OUTPATIENT
Start: 2024-10-24

## 2024-10-24 RX ORDER — BENZONATATE 200 MG/1
200 CAPSULE ORAL 3 TIMES DAILY PRN
Qty: 30 CAPSULE | Refills: 0 | Status: SHIPPED | OUTPATIENT
Start: 2024-10-24

## 2024-10-24 RX ORDER — DEXAMETHASONE 6 MG/1
6 TABLET ORAL DAILY
Qty: 3 TABLET | Refills: 0 | Status: SHIPPED | OUTPATIENT
Start: 2024-10-24

## 2024-10-24 ASSESSMENT — ENCOUNTER SYMPTOMS: COUGH: 1

## 2024-12-07 ENCOUNTER — OFFICE VISIT (OUTPATIENT)
Dept: URGENT CARE | Facility: CLINIC | Age: 44
End: 2024-12-07
Payer: COMMERCIAL

## 2024-12-07 ENCOUNTER — APPOINTMENT (OUTPATIENT)
Dept: RADIOLOGY | Facility: IMAGING CENTER | Age: 44
End: 2024-12-07
Attending: PHYSICIAN ASSISTANT
Payer: COMMERCIAL

## 2024-12-07 VITALS
RESPIRATION RATE: 19 BRPM | BODY MASS INDEX: 38.56 KG/M2 | SYSTOLIC BLOOD PRESSURE: 112 MMHG | DIASTOLIC BLOOD PRESSURE: 66 MMHG | HEART RATE: 91 BPM | HEIGHT: 71 IN | OXYGEN SATURATION: 94 % | TEMPERATURE: 98.1 F | WEIGHT: 275.4 LBS

## 2024-12-07 DIAGNOSIS — R05.3 CHRONIC COUGH: ICD-10-CM

## 2024-12-07 PROCEDURE — 99214 OFFICE O/P EST MOD 30 MIN: CPT | Performed by: PHYSICIAN ASSISTANT

## 2024-12-07 PROCEDURE — 3074F SYST BP LT 130 MM HG: CPT | Performed by: PHYSICIAN ASSISTANT

## 2024-12-07 PROCEDURE — 71046 X-RAY EXAM CHEST 2 VIEWS: CPT | Mod: TC | Performed by: PHYSICIAN ASSISTANT

## 2024-12-07 PROCEDURE — 3078F DIAST BP <80 MM HG: CPT | Performed by: PHYSICIAN ASSISTANT

## 2024-12-07 RX ORDER — AZELASTINE 1 MG/ML
1 SPRAY, METERED NASAL 2 TIMES DAILY PRN
Qty: 30 ML | Refills: 0 | Status: SHIPPED | OUTPATIENT
Start: 2024-12-07

## 2024-12-07 RX ORDER — BENZONATATE 200 MG/1
200 CAPSULE ORAL 3 TIMES DAILY PRN
Qty: 60 CAPSULE | Refills: 0 | Status: SHIPPED | OUTPATIENT
Start: 2024-12-07

## 2024-12-07 ASSESSMENT — ENCOUNTER SYMPTOMS: COUGH: 1

## 2024-12-08 NOTE — PROGRESS NOTES
"Subjective:   William Carroll is a 44 y.o. male who presents for Cough (Pt has a cough, congestion x 2 months )        Patient presents with concerns of chronic cough.  States that he became ill in September with respiratory symptoms.  Due to persistent nasal congestion and cough he came to urgent care to be evaluated.  He was treated with Decadron and an oral antibiotic.  He feels that this improved his symptoms significantly but his cough never fully resolved.  Cough is occasionally productive.  It is worse at night.  States that his throat feels congested at times, but denies sore throat, pain with swallowing difficulty swallowing.  He feels that it \"loosens up\" in the shower and he is able to cough up some mucus.  He denies current sinus pressure and nasal congestion.  He occasionally has some nasal discharge that is clear to light yellow.  He denies new pets at home and history of allergies.  He is not currently taking anything for his symptoms.  He has history of GERD, but does not feel that his symptoms are being triggered by this.      Review of Systems   Respiratory:  Positive for cough.        PMH:  has no past medical history on file.  MEDS:   Current Outpatient Medications:     budesonide (RINOCORT AQUA) 32 MCG/ACT nasal spray, Administer 2 Sprays into affected nostril(S) every day., Disp: 8.43 mL, Rfl: 0    azelastine (ASTELIN) 137 MCG/SPRAY nasal spray, Administer 1 Spray into affected nostril(S) 2 times a day as needed for Rhinitis., Disp: 30 mL, Rfl: 0    benzonatate (TESSALON) 200 MG capsule, Take 1 Capsule by mouth 3 times a day as needed for Cough., Disp: 60 Capsule, Rfl: 0    dexamethasone (DECADRON) 6 MG Tab, Take 1 Tablet by mouth every day. (Patient not taking: Reported on 12/7/2024), Disp: 3 Tablet, Rfl: 0    doxycycline (MONODOX) 100 MG capsule, Take 1 Capsule by mouth 2 times a day. (Patient not taking: Reported on 12/7/2024), Disp: 14 Capsule, Rfl: 0    Loratadine (CLARITIN) 10 MG Cap, " "Take 10 mg by mouth every day. (Patient not taking: Reported on 12/7/2024), Disp: 30 Capsule, Rfl: 1    ondansetron (ZOFRAN ODT) 4 MG TABLET DISPERSIBLE, Take 1 Tab by mouth every 8 hours as needed. (Patient not taking: Reported on 12/7/2024), Disp: 10 Tab, Rfl: 0  ALLERGIES: No Known Allergies  SURGHX: History reviewed. No pertinent surgical history.  SOCHX:  reports that he has never smoked. He has never used smokeless tobacco. He reports that he does not drink alcohol and does not use drugs.  FH: Family history was reviewed, no pertinent findings to report   Objective:   /66 (BP Location: Left arm, Patient Position: Sitting, BP Cuff Size: Large adult)   Pulse 91   Temp 36.7 °C (98.1 °F) (Temporal)   Resp 19   Ht 1.803 m (5' 11\")   Wt 125 kg (275 lb 6.4 oz)   SpO2 94%   BMI 38.41 kg/m²   Physical Exam  Vitals reviewed.   Constitutional:       General: He is not in acute distress.     Appearance: Normal appearance. He is well-developed. He is not toxic-appearing.   HENT:      Head: Normocephalic and atraumatic.      Right Ear: Tympanic membrane, ear canal and external ear normal.      Left Ear: Tympanic membrane, ear canal and external ear normal.      Nose: Nose normal. No congestion or rhinorrhea.      Mouth/Throat:      Lips: Pink.      Mouth: Mucous membranes are moist.      Pharynx: Oropharynx is clear. Uvula midline.   Cardiovascular:      Rate and Rhythm: Normal rate and regular rhythm.      Heart sounds: Normal heart sounds, S1 normal and S2 normal.   Pulmonary:      Effort: Pulmonary effort is normal. No respiratory distress.      Breath sounds: Normal breath sounds. No stridor. No decreased breath sounds, wheezing, rhonchi or rales.   Skin:     General: Skin is dry.   Neurological:      Comments: Alert and oriented.    Psychiatric:         Speech: Speech normal.         Behavior: Behavior normal.           RADIOLOGY RESULTS   DX-CHEST-2 VIEWS    Result Date: 12/7/2024 12/7/2024 8:00 PM " HISTORY/REASON FOR EXAM:  Cough. TECHNIQUE/EXAM DESCRIPTION AND NUMBER OF VIEWS: Two views of the chest. COMPARISON:  None. FINDINGS: Cardiomediastinal silhouette is normal. No focal consolidation, pleural effusion, pulmonary edema or pneumothorax. No acute osseous abnormality.     No acute cardiopulmonary abnormality.           Assessment/Plan:   1. Chronic cough  - DX-CHEST-2 VIEWS; Future  - budesonide (RINOCORT AQUA) 32 MCG/ACT nasal spray; Administer 2 Sprays into affected nostril(S) every day.  Dispense: 8.43 mL; Refill: 0  - azelastine (ASTELIN) 137 MCG/SPRAY nasal spray; Administer 1 Spray into affected nostril(S) 2 times a day as needed for Rhinitis.  Dispense: 30 mL; Refill: 0  - benzonatate (TESSALON) 200 MG capsule; Take 1 Capsule by mouth 3 times a day as needed for Cough.  Dispense: 60 Capsule; Refill: 0    Chest x-ray within normal limits-no evidence of bronchitis, pneumonia, malignancy, CHF or other abnormality.    Low clinical suspicion for lower respiratory tract infection at this time.  Low clinical suspicion for bacterial sinusitis at this time as well.  I suspect that patient's persistent cough may be due to postnasal drip.    Recommend that we begin him on an intranasal steroid daily and azelastine as needed.  May also take Tessalon Perles as needed.  If this fails to improve and resolve his symptoms recommend that he follow-up with primary care or return to clinic for reevaluation.  Recommend immediate reevaluation with any new or worsening symptoms.

## 2025-02-12 ENCOUNTER — OFFICE VISIT (OUTPATIENT)
Dept: URGENT CARE | Facility: PHYSICIAN GROUP | Age: 45
End: 2025-02-12
Payer: COMMERCIAL

## 2025-02-12 ENCOUNTER — HOSPITAL ENCOUNTER (OUTPATIENT)
Dept: LAB | Facility: MEDICAL CENTER | Age: 45
End: 2025-02-12
Attending: STUDENT IN AN ORGANIZED HEALTH CARE EDUCATION/TRAINING PROGRAM
Payer: COMMERCIAL

## 2025-02-12 VITALS
TEMPERATURE: 96.9 F | SYSTOLIC BLOOD PRESSURE: 140 MMHG | HEART RATE: 62 BPM | RESPIRATION RATE: 17 BRPM | BODY MASS INDEX: 38.15 KG/M2 | WEIGHT: 272.5 LBS | HEIGHT: 71 IN | DIASTOLIC BLOOD PRESSURE: 70 MMHG | OXYGEN SATURATION: 97 %

## 2025-02-12 DIAGNOSIS — R23.3 PETECHIAL RASH: ICD-10-CM

## 2025-02-12 LAB
ALBUMIN SERPL BCP-MCNC: 4.6 G/DL (ref 3.2–4.9)
ALBUMIN/GLOB SERPL: 1.4 G/DL
ALP SERPL-CCNC: 67 U/L (ref 30–99)
ALT SERPL-CCNC: 38 U/L (ref 2–50)
ANION GAP SERPL CALC-SCNC: 9 MMOL/L (ref 7–16)
AST SERPL-CCNC: 31 U/L (ref 12–45)
BASOPHILS # BLD AUTO: 1.1 % (ref 0–1.8)
BASOPHILS # BLD: 0.07 K/UL (ref 0–0.12)
BILIRUB SERPL-MCNC: 0.3 MG/DL (ref 0.1–1.5)
BUN SERPL-MCNC: 12 MG/DL (ref 8–22)
CALCIUM ALBUM COR SERPL-MCNC: 9.4 MG/DL (ref 8.5–10.5)
CALCIUM SERPL-MCNC: 9.9 MG/DL (ref 8.5–10.5)
CHLORIDE SERPL-SCNC: 105 MMOL/L (ref 96–112)
CO2 SERPL-SCNC: 26 MMOL/L (ref 20–33)
CREAT SERPL-MCNC: 0.96 MG/DL (ref 0.5–1.4)
EOSINOPHIL # BLD AUTO: 0.24 K/UL (ref 0–0.51)
EOSINOPHIL NFR BLD: 3.9 % (ref 0–6.9)
ERYTHROCYTE [DISTWIDTH] IN BLOOD BY AUTOMATED COUNT: 42.3 FL (ref 35.9–50)
GFR SERPLBLD CREATININE-BSD FMLA CKD-EPI: 101 ML/MIN/1.73 M 2
GLOBULIN SER CALC-MCNC: 3.4 G/DL (ref 1.9–3.5)
GLUCOSE SERPL-MCNC: 94 MG/DL (ref 65–99)
HCT VFR BLD AUTO: 48.3 % (ref 42–52)
HGB BLD-MCNC: 16.4 G/DL (ref 14–18)
IMM GRANULOCYTES # BLD AUTO: 0.03 K/UL (ref 0–0.11)
IMM GRANULOCYTES NFR BLD AUTO: 0.5 % (ref 0–0.9)
LYMPHOCYTES # BLD AUTO: 2.01 K/UL (ref 1–4.8)
LYMPHOCYTES NFR BLD: 32.9 % (ref 22–41)
MCH RBC QN AUTO: 29.7 PG (ref 27–33)
MCHC RBC AUTO-ENTMCNC: 34 G/DL (ref 32.3–36.5)
MCV RBC AUTO: 87.3 FL (ref 81.4–97.8)
MONOCYTES # BLD AUTO: 0.82 K/UL (ref 0–0.85)
MONOCYTES NFR BLD AUTO: 13.4 % (ref 0–13.4)
NEUTROPHILS # BLD AUTO: 2.94 K/UL (ref 1.82–7.42)
NEUTROPHILS NFR BLD: 48.2 % (ref 44–72)
NRBC # BLD AUTO: 0 K/UL
NRBC BLD-RTO: 0 /100 WBC (ref 0–0.2)
PLATELET # BLD AUTO: 233 K/UL (ref 164–446)
PMV BLD AUTO: 11 FL (ref 9–12.9)
POTASSIUM SERPL-SCNC: 4.6 MMOL/L (ref 3.6–5.5)
PROT SERPL-MCNC: 8 G/DL (ref 6–8.2)
RBC # BLD AUTO: 5.53 M/UL (ref 4.7–6.1)
SODIUM SERPL-SCNC: 140 MMOL/L (ref 135–145)
WBC # BLD AUTO: 6.1 K/UL (ref 4.8–10.8)

## 2025-02-12 PROCEDURE — 85025 COMPLETE CBC W/AUTO DIFF WBC: CPT

## 2025-02-12 PROCEDURE — 3078F DIAST BP <80 MM HG: CPT | Performed by: STUDENT IN AN ORGANIZED HEALTH CARE EDUCATION/TRAINING PROGRAM

## 2025-02-12 PROCEDURE — 80053 COMPREHEN METABOLIC PANEL: CPT

## 2025-02-12 PROCEDURE — 36415 COLL VENOUS BLD VENIPUNCTURE: CPT

## 2025-02-12 PROCEDURE — 3077F SYST BP >= 140 MM HG: CPT | Performed by: STUDENT IN AN ORGANIZED HEALTH CARE EDUCATION/TRAINING PROGRAM

## 2025-02-12 PROCEDURE — 99214 OFFICE O/P EST MOD 30 MIN: CPT | Performed by: STUDENT IN AN ORGANIZED HEALTH CARE EDUCATION/TRAINING PROGRAM

## 2025-02-12 ASSESSMENT — ENCOUNTER SYMPTOMS
ABDOMINAL PAIN: 0
CONSTIPATION: 0
MYALGIAS: 0
PALPITATIONS: 0
SHORTNESS OF BREATH: 0
HEADACHES: 0
NAUSEA: 0
VOMITING: 0
NECK PAIN: 0
CHILLS: 0
FEVER: 0
DIARRHEA: 0
COUGH: 0
BACK PAIN: 0
DIZZINESS: 0
BLOOD IN STOOL: 0
SORE THROAT: 0
WHEEZING: 0

## 2025-02-12 NOTE — PROGRESS NOTES
"Subjective     William Carroll is a 44 y.o. male who presents with Rash (On both legs x 1 day)            William is a 44 y.o. male who presents to urgent care with a rash on bilateral lower legs.  Patient noticed rash yesterday.  Rash is somewhat itchy in characteristic.  Patient reports no new soaps, laundry detergents, lotions.  Reports no fever/chills, body aches, URI-like symptoms.  No abdominal pain or nausea, vomiting, diarrhea.  No recent travel.  Patient noticed rash yesterday who presents to urgent care for evaluation.  He does report recent [employer] tournament in which he had KT tape on his left leg and states that the KT tape was tight/restrictive.  No KT tape his right leg.  States rash is on both lower legs.        Review of Systems   Constitutional:  Negative for chills, fever and malaise/fatigue.   HENT:  Negative for congestion, ear pain and sore throat.    Respiratory:  Negative for cough, shortness of breath and wheezing.    Cardiovascular:  Negative for chest pain and palpitations.   Gastrointestinal:  Negative for abdominal pain, blood in stool, constipation, diarrhea, melena, nausea and vomiting.   Musculoskeletal:  Negative for back pain, myalgias and neck pain.   Skin:  Positive for rash.   Neurological:  Negative for dizziness and headaches.   All other systems reviewed and are negative.             Objective     BP (!) 140/70   Pulse 62   Temp 36.1 °C (96.9 °F)   Resp 17   Ht 1.803 m (5' 11\")   Wt 124 kg (272 lb 8 oz)   SpO2 97%   BMI 38.01 kg/m²      Physical Exam  Vitals reviewed.   Constitutional:       Appearance: Normal appearance.   HENT:      Head: Normocephalic and atraumatic.      Nose: Nose normal.      Mouth/Throat:      Mouth: Mucous membranes are moist.      Pharynx: Oropharynx is clear.   Eyes:      Extraocular Movements: Extraocular movements intact.      Conjunctiva/sclera: Conjunctivae normal.      Pupils: Pupils are equal, round, and reactive to light. "   Cardiovascular:      Rate and Rhythm: Normal rate.   Pulmonary:      Effort: Pulmonary effort is normal.   Musculoskeletal:      Right lower leg: No edema.      Left lower leg: No edema.   Skin:     General: Skin is warm and dry.      Comments: Small erythematous, round, flat lesions of bilateral lower legs (scattered). Non-blanching.   Neurological:      General: No focal deficit present.      Mental Status: He is alert and oriented to person, place, and time.                                  Assessment & Plan  Petechial rash    Orders:    CBC WITH DIFFERENTIAL; Future    Comp Metabolic Panel; Future    Referral back to PCP         Differential diagnoses, supportive care measures and indications for immediate follow-up discussed with patient. Pathogenesis of diagnosis discussed including typical length and natural progression.  Follow up with PCP. ER precautions discussed.    Instructed to return to urgent care or nearest emergency department if symptoms fail to improve, for any change in condition, further concerns, or new concerning symptoms.    Patient states understanding and agrees with the plan of care and discharge instructions.         My total time spent caring for the patient on the day of the encounter was 30 minutes including obtain patient history, physical exam, discussing differential diagnosis complaining of her supportive care, appropriate follow-up and indications for immediate follow-up. This does not include time spent on separately billable procedures/tests.

## 2025-02-23 ENCOUNTER — OFFICE VISIT (OUTPATIENT)
Dept: URGENT CARE | Facility: PHYSICIAN GROUP | Age: 45
End: 2025-02-23
Payer: COMMERCIAL

## 2025-02-23 VITALS
TEMPERATURE: 98.2 F | WEIGHT: 271.17 LBS | BODY MASS INDEX: 37.82 KG/M2 | DIASTOLIC BLOOD PRESSURE: 84 MMHG | SYSTOLIC BLOOD PRESSURE: 126 MMHG | RESPIRATION RATE: 18 BRPM | HEART RATE: 72 BPM | OXYGEN SATURATION: 95 %

## 2025-02-23 DIAGNOSIS — R21 RASH: ICD-10-CM

## 2025-02-23 PROCEDURE — 3079F DIAST BP 80-89 MM HG: CPT | Performed by: FAMILY MEDICINE

## 2025-02-23 PROCEDURE — 99214 OFFICE O/P EST MOD 30 MIN: CPT | Performed by: FAMILY MEDICINE

## 2025-02-23 PROCEDURE — 3074F SYST BP LT 130 MM HG: CPT | Performed by: FAMILY MEDICINE

## 2025-02-23 RX ORDER — SULFAMETHOXAZOLE AND TRIMETHOPRIM 800; 160 MG/1; MG/1
1 TABLET ORAL 2 TIMES DAILY
Qty: 14 TABLET | Refills: 0 | Status: SHIPPED | OUTPATIENT
Start: 2025-02-23 | End: 2025-03-02

## 2025-02-23 ASSESSMENT — FIBROSIS 4 INDEX: FIB4 SCORE: 0.91

## 2025-02-23 NOTE — PROGRESS NOTES
Subjective:      Chief Complaint   Patient presents with    Rash     Bilateral leg rash, red spots, itchy X 2 weeks pt was seen ~ 10 days ago and the rash has worsened               Rash  This is an ongoing problem.   Seen on 2/12.    Now rash is worse, spreading to both arms now.    + mildly itchy       The rash is characterized by redness and itchiness.        Pertinent negatives include no cough, diarrhea or fever. Past treatments include: none        Pt denies:  Unusual chemical exposure  Change in soaps, detergents, body products, etc  Change in diet  Recent travel        Current Outpatient Medications on File Prior to Visit   Medication Sig Dispense Refill    budesonide (RINOCORT AQUA) 32 MCG/ACT nasal spray Administer 2 Sprays into affected nostril(S) every day. (Patient not taking: Reported on 2/23/2025) 8.43 mL 0    azelastine (ASTELIN) 137 MCG/SPRAY nasal spray Administer 1 Spray into affected nostril(S) 2 times a day as needed for Rhinitis. (Patient not taking: Reported on 2/23/2025) 30 mL 0    benzonatate (TESSALON) 200 MG capsule Take 1 Capsule by mouth 3 times a day as needed for Cough. (Patient not taking: Reported on 2/23/2025) 60 Capsule 0    dexamethasone (DECADRON) 6 MG Tab Take 1 Tablet by mouth every day. (Patient not taking: Reported on 12/7/2024) 3 Tablet 0    doxycycline (MONODOX) 100 MG capsule Take 1 Capsule by mouth 2 times a day. (Patient not taking: Reported on 12/7/2024) 14 Capsule 0    Loratadine (CLARITIN) 10 MG Cap Take 10 mg by mouth every day. (Patient not taking: Reported on 12/7/2024) 30 Capsule 1    ondansetron (ZOFRAN ODT) 4 MG TABLET DISPERSIBLE Take 1 Tab by mouth every 8 hours as needed. (Patient not taking: Reported on 12/7/2024) 10 Tab 0     No current facility-administered medications on file prior to visit.       Social History     Tobacco Use    Smoking status: Never    Smokeless tobacco: Never   Vaping Use    Vaping status: Never Used   Substance Use Topics    Alcohol  use: No    Drug use: No         No past medical history on file.          Review of Systems   Constitutional: Negative for fever.   Respiratory: Negative for cough.    Gastrointestinal: Negative for diarrhea.   Skin: Positive for rash.          Objective:     /84 (BP Location: Left arm, Patient Position: Sitting, BP Cuff Size: Adult)   Pulse 72   Temp 36.8 °C (98.2 °F) (Temporal)   Resp 18   Wt 123 kg (271 lb 2.7 oz)   SpO2 95%     Physical Exam   Constitutional: pt is oriented to person, place, and time. Pt appears well-developed. No distress.   HENT:   Head: Normocephalic and atraumatic.   Eyes: Conjunctivae are normal.   Cardiovascular: Normal rate, regular rhythm and normal heart sounds.    Pulmonary/Chest: Effort normal and breath sounds normal. No respiratory distress.   Neurological: pt is alert and oriented to person, place, and time. No cranial nerve deficit.   Skin: Skin is warm. Rash (diffuse, multiple small papules and pustules on an erythematous base that are pierced by a central hair , most prominent on arms, legs) noted. Pt is not diaphoretic. There is erythema.   Psychiatric:  behavior is normal.   Nursing note and vitals reviewed.         Hospital Outpatient Visit on 02/12/2025   Component Date Value Ref Range Status    Sodium 02/12/2025 140  135 - 145 mmol/L Final    Potassium 02/12/2025 4.6  3.6 - 5.5 mmol/L Final    Chloride 02/12/2025 105  96 - 112 mmol/L Final    Co2 02/12/2025 26  20 - 33 mmol/L Final    Anion Gap 02/12/2025 9.0  7.0 - 16.0 Final    Glucose 02/12/2025 94  65 - 99 mg/dL Final    Bun 02/12/2025 12  8 - 22 mg/dL Final    Creatinine 02/12/2025 0.96  0.50 - 1.40 mg/dL Final    Calcium 02/12/2025 9.9  8.5 - 10.5 mg/dL Final    Correct Calcium 02/12/2025 9.4  8.5 - 10.5 mg/dL Final    AST(SGOT) 02/12/2025 31  12 - 45 U/L Final    ALT(SGPT) 02/12/2025 38  2 - 50 U/L Final    Alkaline Phosphatase 02/12/2025 67  30 - 99 U/L Final    Total Bilirubin 02/12/2025 0.3  0.1 - 1.5  mg/dL Final    Albumin 02/12/2025 4.6  3.2 - 4.9 g/dL Final    Total Protein 02/12/2025 8.0  6.0 - 8.2 g/dL Final    Globulin 02/12/2025 3.4  1.9 - 3.5 g/dL Final    A-G Ratio 02/12/2025 1.4  g/dL Final    WBC 02/12/2025 6.1  4.8 - 10.8 K/uL Final    RBC 02/12/2025 5.53  4.70 - 6.10 M/uL Final    Hemoglobin 02/12/2025 16.4  14.0 - 18.0 g/dL Final    Hematocrit 02/12/2025 48.3  42.0 - 52.0 % Final    MCV 02/12/2025 87.3  81.4 - 97.8 fL Final    MCH 02/12/2025 29.7  27.0 - 33.0 pg Final    MCHC 02/12/2025 34.0  32.3 - 36.5 g/dL Final    RDW 02/12/2025 42.3  35.9 - 50.0 fL Final    Platelet Count 02/12/2025 233  164 - 446 K/uL Final    MPV 02/12/2025 11.0  9.0 - 12.9 fL Final    Neutrophils-Polys 02/12/2025 48.20  44.00 - 72.00 % Final    Lymphocytes 02/12/2025 32.90  22.00 - 41.00 % Final    Monocytes 02/12/2025 13.40  0.00 - 13.40 % Final    Eosinophils 02/12/2025 3.90  0.00 - 6.90 % Final    Basophils 02/12/2025 1.10  0.00 - 1.80 % Final    Immature Granulocytes 02/12/2025 0.50  0.00 - 0.90 % Final    Nucleated RBC 02/12/2025 0.00  0.00 - 0.20 /100 WBC Final    Neutrophils (Absolute) 02/12/2025 2.94  1.82 - 7.42 K/uL Final    Includes immature neutrophils, if present.    Lymphs (Absolute) 02/12/2025 2.01  1.00 - 4.80 K/uL Final    Monos (Absolute) 02/12/2025 0.82  0.00 - 0.85 K/uL Final    Eos (Absolute) 02/12/2025 0.24  0.00 - 0.51 K/uL Final    Baso (Absolute) 02/12/2025 0.07  0.00 - 0.12 K/uL Final    Immature Granulocytes (abs) 02/12/2025 0.03  0.00 - 0.11 K/uL Final    NRBC (Absolute) 02/12/2025 0.00  K/uL Final    GFR (CKD-EPI) 02/12/2025 101  >60 mL/min/1.73 m 2 Final    Comment: Estimated Glomerular Filtration Rate is calculated using  race neutral CKD-EPI 2021 equation per NKF-ASN recommendations.            Assessment/Plan:     1. Rash      Recent labs reviewed:    CBC shows no leukocytosis.   Platelets, LFTs wnl.      Precise etiology unclear, but I suspect folliculitis        Rx bactrim         I advised  pt that it is not uncommon for a rash to start with very general and nonspecific characteristics, and become more differentiated over time.  Advised to inform me of any changes via MyChart pictures and will RTC if sx worsen      Will tx empirically with Bactrim  for now      - sulfamethoxazole-trimethoprim (BACTRIM DS) 800-160 MG tablet; Take 1 Tablet by mouth 2 times a day for 7 days.  Dispense: 14 Tablet; Refill: 0

## 2025-02-28 ENCOUNTER — APPOINTMENT (OUTPATIENT)
Dept: TELEHEALTH | Facility: TELEMEDICINE | Age: 45
End: 2025-02-28
Payer: COMMERCIAL

## 2025-02-28 ENCOUNTER — OFFICE VISIT (OUTPATIENT)
Dept: URGENT CARE | Facility: PHYSICIAN GROUP | Age: 45
End: 2025-02-28
Payer: COMMERCIAL

## 2025-02-28 VITALS
HEIGHT: 71 IN | OXYGEN SATURATION: 96 % | WEIGHT: 278.66 LBS | HEART RATE: 84 BPM | TEMPERATURE: 97.9 F | SYSTOLIC BLOOD PRESSURE: 120 MMHG | DIASTOLIC BLOOD PRESSURE: 86 MMHG | BODY MASS INDEX: 39.01 KG/M2 | RESPIRATION RATE: 19 BRPM

## 2025-02-28 DIAGNOSIS — D69.2 PURPURA (HCC): ICD-10-CM

## 2025-02-28 ASSESSMENT — ENCOUNTER SYMPTOMS
NAUSEA: 0
FEVER: 0
MYALGIAS: 0
EYE REDNESS: 0
SORE THROAT: 0
DIZZINESS: 0
CHILLS: 0
SHORTNESS OF BREATH: 0
VOMITING: 0

## 2025-02-28 ASSESSMENT — FIBROSIS 4 INDEX: FIB4 SCORE: 0.91

## 2025-03-01 NOTE — PROGRESS NOTES
"Subjective:   William Carroll is a 44 y.o. male who presents for Rash (X was seen 2/23 for the same thing, the itchiness went away, more spots and not getting better, swelling in ankles, rash is mostly on the legs but is spreading to hands )      HPI  Patient is a 44-year-old male presents urgent care for evaluation of a rash that has been ongoing since 2/23.  Patient has been evaluated on 2 other urgent care visits last visit was prescribed Bactrim for suspected folliculitis.  Labs were initially ran which were all within normal limits.  Rash has progressively worsened.  He has no fevers, rash is not pruritic, denies any chest pain, shortness of breath.    Review of Systems   Constitutional:  Negative for chills and fever.   HENT:  Negative for sore throat.    Eyes:  Negative for redness.   Respiratory:  Negative for shortness of breath.    Cardiovascular:  Negative for chest pain.   Gastrointestinal:  Negative for nausea and vomiting.   Genitourinary:  Negative for dysuria.   Musculoskeletal:  Negative for myalgias.   Skin:  Positive for rash. Negative for itching.   Neurological:  Negative for dizziness.       Medications:        Allergies: Patient has no known allergies.    Problem List: William Carroll does not have any pertinent problems on file.    Surgical History:  No past surgical history on file.    Past Social Hx: William Carroll  reports that he has never smoked. He has never used smokeless tobacco. He reports that he does not drink alcohol and does not use drugs.     Past Family Hx:  William Carroll family history is not on file.     Problem list, medications, and allergies reviewed by myself today in Epic.     Objective:     /86 (BP Location: Right arm, Patient Position: Sitting, BP Cuff Size: Adult)   Pulse 84   Temp 36.6 °C (97.9 °F)   Resp 19   Ht 1.803 m (5' 11\")   Wt (!) 126 kg (278 lb 10.6 oz)   SpO2 96%   BMI 38.87 kg/m²     Physical " Exam  Constitutional:       Appearance: Normal appearance. He is not ill-appearing or toxic-appearing.   HENT:      Head: Normocephalic.      Right Ear: External ear normal.      Left Ear: External ear normal.      Nose: Nose normal.      Mouth/Throat:      Lips: Pink.   Eyes:      General: Lids are normal.   Pulmonary:      Effort: Pulmonary effort is normal. No accessory muscle usage.   Musculoskeletal:      Cervical back: Full passive range of motion without pain.   Skin:     Findings: Rash present. Rash is purpuric.   Neurological:      Mental Status: He is alert and oriented to person, place, and time.   Psychiatric:         Mood and Affect: Mood normal.         Thought Content: Thought content normal.         Assessment/Plan:     Diagnosis and associated orders:     1. Purpura (HCC)             Comments/MDM:     At this time, I feel the patient requires a higher level of care including closer monitoring, stat lab work and/or imaging for further evaluation . This has been discussed with the patient and they state agreement and understanding The patient is in no acute distress upon clinic departure and will go directly to ED without delay.                Please note that this dictation was created using voice recognition software. I have made a reasonable attempt to correct obvious errors, but I expect that there are errors of grammar and possibly content that I did not discover before finalizing the note.    This note was electronically signed by Maurice TALBOT.

## 2025-03-06 ENCOUNTER — HOSPITAL ENCOUNTER (OUTPATIENT)
Dept: LAB | Facility: MEDICAL CENTER | Age: 45
End: 2025-03-06
Payer: COMMERCIAL

## 2025-03-06 LAB
BASOPHILS # BLD AUTO: 0.9 % (ref 0–1.8)
BASOPHILS # BLD: 0.05 K/UL (ref 0–0.12)
EOSINOPHIL # BLD AUTO: 0.21 K/UL (ref 0–0.51)
EOSINOPHIL NFR BLD: 3.9 % (ref 0–6.9)
ERYTHROCYTE [DISTWIDTH] IN BLOOD BY AUTOMATED COUNT: 41.2 FL (ref 35.9–50)
HBV SURFACE AG SER QL: NORMAL
HCT VFR BLD AUTO: 47.8 % (ref 42–52)
HCV AB SER QL: NORMAL
HGB BLD-MCNC: 15.9 G/DL (ref 14–18)
HIV 1+2 AB+HIV1 P24 AG SERPL QL IA: NORMAL
IMM GRANULOCYTES # BLD AUTO: 0.02 K/UL (ref 0–0.11)
IMM GRANULOCYTES NFR BLD AUTO: 0.4 % (ref 0–0.9)
LYMPHOCYTES # BLD AUTO: 1.57 K/UL (ref 1–4.8)
LYMPHOCYTES NFR BLD: 28.9 % (ref 22–41)
MCH RBC QN AUTO: 29 PG (ref 27–33)
MCHC RBC AUTO-ENTMCNC: 33.3 G/DL (ref 32.3–36.5)
MCV RBC AUTO: 87.2 FL (ref 81.4–97.8)
MONOCYTES # BLD AUTO: 0.6 K/UL (ref 0–0.85)
MONOCYTES NFR BLD AUTO: 11 % (ref 0–13.4)
NEUTROPHILS # BLD AUTO: 2.99 K/UL (ref 1.82–7.42)
NEUTROPHILS NFR BLD: 54.9 % (ref 44–72)
NRBC # BLD AUTO: 0 K/UL
NRBC BLD-RTO: 0 /100 WBC (ref 0–0.2)
PLATELET # BLD AUTO: 271 K/UL (ref 164–446)
PMV BLD AUTO: 11.3 FL (ref 9–12.9)
RBC # BLD AUTO: 5.48 M/UL (ref 4.7–6.1)
WBC # BLD AUTO: 5.4 K/UL (ref 4.8–10.8)

## 2025-03-06 PROCEDURE — 86803 HEPATITIS C AB TEST: CPT

## 2025-03-06 PROCEDURE — 82784 ASSAY IGA/IGD/IGG/IGM EACH: CPT

## 2025-03-06 PROCEDURE — 85025 COMPLETE CBC W/AUTO DIFF WBC: CPT

## 2025-03-06 PROCEDURE — 87389 HIV-1 AG W/HIV-1&-2 AB AG IA: CPT

## 2025-03-06 PROCEDURE — 86735 MUMPS ANTIBODY: CPT

## 2025-03-06 PROCEDURE — 86694 HERPES SIMPLEX NES ANTBDY: CPT

## 2025-03-06 PROCEDURE — 36415 COLL VENOUS BLD VENIPUNCTURE: CPT

## 2025-03-06 PROCEDURE — 86765 RUBEOLA ANTIBODY: CPT

## 2025-03-06 PROCEDURE — 87340 HEPATITIS B SURFACE AG IA: CPT

## 2025-03-06 PROCEDURE — 86762 RUBELLA ANTIBODY: CPT

## 2025-03-08 LAB — IGM SERPL-MCNC: 50 MG/DL (ref 35–263)

## 2025-03-09 LAB
HSV1+2 IGG SER IA-ACNC: 0.55 IV
MEV IGG SER-ACNC: 85.9 AU/ML
MUV IGG SER IA-ACNC: 148 AU/ML
RUBV AB SER QL: 20.9 IU/ML

## 2025-03-11 ENCOUNTER — HOSPITAL ENCOUNTER (OUTPATIENT)
Dept: LAB | Facility: MEDICAL CENTER | Age: 45
End: 2025-03-11
Payer: COMMERCIAL

## 2025-03-11 LAB — ERYTHROCYTE [SEDIMENTATION RATE] IN BLOOD BY WESTERGREN METHOD: 8 MM/HOUR (ref 0–20)

## 2025-03-11 PROCEDURE — 85613 RUSSELL VIPER VENOM DILUTED: CPT

## 2025-03-11 PROCEDURE — 84550 ASSAY OF BLOOD/URIC ACID: CPT

## 2025-03-11 PROCEDURE — 86812 HLA TYPING A B OR C: CPT

## 2025-03-11 PROCEDURE — 86039 ANTINUCLEAR ANTIBODIES (ANA): CPT

## 2025-03-11 PROCEDURE — 86256 FLUORESCENT ANTIBODY TITER: CPT

## 2025-03-11 PROCEDURE — 85610 PROTHROMBIN TIME: CPT

## 2025-03-11 PROCEDURE — 85652 RBC SED RATE AUTOMATED: CPT

## 2025-03-11 PROCEDURE — 86225 DNA ANTIBODY NATIVE: CPT

## 2025-03-11 PROCEDURE — 36415 COLL VENOUS BLD VENIPUNCTURE: CPT

## 2025-03-11 PROCEDURE — 86765 RUBEOLA ANTIBODY: CPT

## 2025-03-11 PROCEDURE — 86431 RHEUMATOID FACTOR QUANT: CPT

## 2025-03-11 PROCEDURE — 85220 BLOOC CLOT FACTOR V TEST: CPT

## 2025-03-11 PROCEDURE — 86140 C-REACTIVE PROTEIN: CPT

## 2025-03-11 PROCEDURE — 85520 HEPARIN ASSAY: CPT

## 2025-03-11 PROCEDURE — 86200 CCP ANTIBODY: CPT

## 2025-03-11 PROCEDURE — 81315 PML/RARALPHA COM BREAKPOINTS: CPT

## 2025-03-11 PROCEDURE — 85730 THROMBOPLASTIN TIME PARTIAL: CPT

## 2025-03-11 PROCEDURE — 86038 ANTINUCLEAR ANTIBODIES: CPT

## 2025-03-11 PROCEDURE — 82180 ASSAY OF ASCORBIC ACID: CPT

## 2025-03-12 LAB
APTT PPP: 30.3 SEC (ref 24.7–36)
CRP SERPL HS-MCNC: 1.74 MG/DL (ref 0–0.75)
INR PPP: 0.98 (ref 0.87–1.13)
LA PPP-IMP: NORMAL
LMWH PPP CHRO-ACNC: <0.1 U/ML
PROTHROMBIN TIME: 13 SEC (ref 12–14.6)
RHEUMATOID FACT SER IA-ACNC: <10 IU/ML (ref 0–14)
SCREEN DRVVT: 44.8 SEC (ref 28–48)
URATE SERPL-MCNC: 6.9 MG/DL (ref 2.5–8.3)

## 2025-03-13 LAB
CCP IGA+IGG SERPL IA-ACNC: 3 UNITS (ref 0–19)
DSDNA AB TITR SER CLIF: NORMAL {TITER}
MEV IGG SER-ACNC: 77.2 AU/ML
NUCLEAR IGG SER QL IA: DETECTED

## 2025-03-14 LAB
FACT V ACT/NOR PPP: 91 % (ref 62–140)
HLA-B27 QL FC: NEGATIVE

## 2025-03-15 LAB
ANA PAT SER IF-IMP: NORMAL
DSDNA IGG TITR SER CLIF: NORMAL {TITER}
NUCLEAR IGG SER QL IF: NORMAL

## 2025-03-16 LAB — VIT C SERPL-MCNC: 35 UMOL/L (ref 23–114)

## 2025-03-20 ENCOUNTER — TELEPHONE (OUTPATIENT)
Dept: HEALTH INFORMATION MANAGEMENT | Facility: OTHER | Age: 45
End: 2025-03-20
Payer: COMMERCIAL

## 2025-03-24 LAB
T(15;17)(PML,RARA) BLD/T QL: NORMAL
T(15;17)(PML,RARA) NFR BLD/T: NORMAL %

## 2025-03-27 ENCOUNTER — APPOINTMENT (OUTPATIENT)
Dept: URBAN - METROPOLITAN AREA CLINIC 15 | Facility: CLINIC | Age: 45
Setting detail: DERMATOLOGY
End: 2025-03-27

## 2025-03-27 VITALS — WEIGHT: 270 LBS | HEIGHT: 68 IN

## 2025-03-27 DIAGNOSIS — R21 RASH AND OTHER NONSPECIFIC SKIN ERUPTION: ICD-10-CM

## 2025-03-27 PROCEDURE — ? COUNSELING

## 2025-03-27 PROCEDURE — 11104 PUNCH BX SKIN SINGLE LESION: CPT

## 2025-03-27 PROCEDURE — ? PRESCRIPTION

## 2025-03-27 PROCEDURE — ? BIOPSY BY PUNCH METHOD

## 2025-03-27 PROCEDURE — 11105 PUNCH BX SKIN EA SEP/ADDL: CPT

## 2025-03-27 PROCEDURE — ? ADDITIONAL NOTES

## 2025-03-27 RX ORDER — PREDNISONE 20 MG/1
TABLET ORAL
Qty: 30 | Refills: 0 | Status: ERX | COMMUNITY
Start: 2025-03-27

## 2025-03-27 RX ADMIN — PREDNISONE: 20 TABLET ORAL at 00:00

## 2025-03-27 ASSESSMENT — LOCATION DETAILED DESCRIPTION DERM
LOCATION DETAILED: LEFT DISTAL POSTERIOR THIGH
LOCATION DETAILED: LEFT DORSAL FOOT
LOCATION DETAILED: RIGHT DISTAL DORSAL FOREARM
LOCATION DETAILED: RIGHT ANTERIOR DISTAL UPPER ARM
LOCATION DETAILED: RIGHT DISTAL POSTERIOR THIGH
LOCATION DETAILED: LEFT ANTERIOR PROXIMAL THIGH
LOCATION DETAILED: LEFT DISTAL CALF
LOCATION DETAILED: LEFT BUTTOCK
LOCATION DETAILED: LEFT LATERAL ABDOMEN
LOCATION DETAILED: PERIUMBILICAL SKIN
LOCATION DETAILED: RIGHT PROXIMAL PRETIBIAL REGION
LOCATION DETAILED: RIGHT DISTAL CALF
LOCATION DETAILED: LEFT LATERAL DISTAL PRETIBIAL REGION
LOCATION DETAILED: RIGHT BUTTOCK
LOCATION DETAILED: LEFT ANTERIOR PROXIMAL UPPER ARM
LOCATION DETAILED: RIGHT VENTRAL DISTAL FOREARM
LOCATION DETAILED: LEFT PROXIMAL DORSAL FOREARM
LOCATION DETAILED: RIGHT ANTERIOR PROXIMAL THIGH
LOCATION DETAILED: LEFT PROXIMAL POSTERIOR UPPER ARM
LOCATION DETAILED: RIGHT DISTAL POSTERIOR UPPER ARM
LOCATION DETAILED: RIGHT DORSAL FOOT
LOCATION DETAILED: LEFT VENTRAL DISTAL FOREARM
LOCATION DETAILED: LEFT DISTAL PRETIBIAL REGION

## 2025-03-27 ASSESSMENT — LOCATION SIMPLE DESCRIPTION DERM
LOCATION SIMPLE: LEFT CALF
LOCATION SIMPLE: LEFT POSTERIOR THIGH
LOCATION SIMPLE: RIGHT POSTERIOR THIGH
LOCATION SIMPLE: LEFT BUTTOCK
LOCATION SIMPLE: RIGHT UPPER ARM
LOCATION SIMPLE: LEFT THIGH
LOCATION SIMPLE: LEFT FOREARM
LOCATION SIMPLE: RIGHT BUTTOCK
LOCATION SIMPLE: RIGHT FOOT
LOCATION SIMPLE: RIGHT THIGH
LOCATION SIMPLE: LEFT FOOT
LOCATION SIMPLE: RIGHT PRETIBIAL REGION
LOCATION SIMPLE: LEFT UPPER ARM
LOCATION SIMPLE: RIGHT FOREARM
LOCATION SIMPLE: LEFT PRETIBIAL REGION
LOCATION SIMPLE: RIGHT CALF
LOCATION SIMPLE: ABDOMEN

## 2025-03-27 ASSESSMENT — LOCATION ZONE DERM
LOCATION ZONE: LEG
LOCATION ZONE: FEET
LOCATION ZONE: TRUNK
LOCATION ZONE: ARM

## 2025-03-27 NOTE — PROCEDURE: BIOPSY BY PUNCH METHOD

## 2025-03-27 NOTE — HPI: RASH
How Severe Is Your Rash?: moderate
Is This A New Presentation, Or A Follow-Up?: Rash
Additional History: -Pt had an endoscopy before this rash appeared, maybe Anesthesia?\\n-Comes and goes\\n-Temperatures do not effect it \\n-Blood work has been done \\n-Was given oral medication (Urgent care) Bactrim 160mg BID x 7 days BID Feb 23. No changes to skin\\n-Not itchy, not painful, no joint pain, no new medications\\n-Pt starts he has a cold early December, late January

## 2025-03-27 NOTE — PROCEDURE: ADDITIONAL NOTES
Detail Level: Simple
Additional Notes: -Pt states he had a cold in late December, early January \\n-Not painful according to pt\\n-Non blanching \\n-Oral exam done \\n-Palms, soles of feet, face, ears are clear of rash \\n-Discusses with pt punch biopsy as next step
Render Risk Assessment In Note?: no

## 2025-03-31 ENCOUNTER — APPOINTMENT (OUTPATIENT)
Dept: URBAN - METROPOLITAN AREA CLINIC 15 | Facility: CLINIC | Age: 45
Setting detail: DERMATOLOGY
End: 2025-03-31

## 2025-03-31 ENCOUNTER — OFFICE VISIT (OUTPATIENT)
Dept: URGENT CARE | Facility: PHYSICIAN GROUP | Age: 45
End: 2025-03-31
Payer: COMMERCIAL

## 2025-03-31 VITALS
RESPIRATION RATE: 18 BRPM | HEIGHT: 71 IN | BODY MASS INDEX: 37.8 KG/M2 | DIASTOLIC BLOOD PRESSURE: 92 MMHG | WEIGHT: 270 LBS | SYSTOLIC BLOOD PRESSURE: 142 MMHG | TEMPERATURE: 96.8 F | OXYGEN SATURATION: 97 % | HEART RATE: 80 BPM

## 2025-03-31 DIAGNOSIS — R11.2 NAUSEA AND VOMITING, UNSPECIFIED VOMITING TYPE: ICD-10-CM

## 2025-03-31 DIAGNOSIS — R21 RASH AND OTHER NONSPECIFIC SKIN ERUPTION: ICD-10-CM

## 2025-03-31 PROCEDURE — ? ADDITIONAL NOTES

## 2025-03-31 PROCEDURE — 99213 OFFICE O/P EST LOW 20 MIN: CPT | Performed by: NURSE PRACTITIONER

## 2025-03-31 PROCEDURE — ? COUNSELING

## 2025-03-31 PROCEDURE — 99214 OFFICE O/P EST MOD 30 MIN: CPT

## 2025-03-31 PROCEDURE — ? PHOTO-DOCUMENTATION

## 2025-03-31 PROCEDURE — ? ORDER TESTS

## 2025-03-31 RX ORDER — ONDANSETRON 4 MG/1
4 TABLET, ORALLY DISINTEGRATING ORAL ONCE
Status: COMPLETED | OUTPATIENT
Start: 2025-03-31 | End: 2025-03-31

## 2025-03-31 RX ORDER — ONDANSETRON 4 MG/1
4 TABLET, ORALLY DISINTEGRATING ORAL EVERY 6 HOURS PRN
Qty: 15 TABLET | Refills: 0 | Status: SHIPPED | OUTPATIENT
Start: 2025-03-31

## 2025-03-31 RX ORDER — PREDNISONE 20 MG/1
TABLET ORAL
COMMUNITY
Start: 2025-03-27

## 2025-03-31 RX ADMIN — ONDANSETRON 4 MG: 4 TABLET, ORALLY DISINTEGRATING ORAL at 10:19

## 2025-03-31 ASSESSMENT — LOCATION DETAILED DESCRIPTION DERM
LOCATION DETAILED: LEFT DISTAL POSTERIOR THIGH
LOCATION DETAILED: RIGHT DISTAL CALF
LOCATION DETAILED: RIGHT DISTAL DORSAL FOREARM
LOCATION DETAILED: RIGHT ANTERIOR PROXIMAL THIGH
LOCATION DETAILED: LEFT ANTERIOR PROXIMAL UPPER ARM
LOCATION DETAILED: LEFT LATERAL DISTAL PRETIBIAL REGION
LOCATION DETAILED: RIGHT DISTAL POSTERIOR UPPER ARM
LOCATION DETAILED: LEFT VENTRAL DISTAL FOREARM
LOCATION DETAILED: RIGHT DISTAL POSTERIOR THIGH
LOCATION DETAILED: LEFT ANTERIOR PROXIMAL THIGH
LOCATION DETAILED: LEFT DISTAL CALF
LOCATION DETAILED: RIGHT ANTERIOR DISTAL UPPER ARM
LOCATION DETAILED: LEFT LATERAL ABDOMEN
LOCATION DETAILED: PERIUMBILICAL SKIN
LOCATION DETAILED: LEFT DORSAL FOOT
LOCATION DETAILED: RIGHT DORSAL FOOT
LOCATION DETAILED: RIGHT VENTRAL DISTAL FOREARM
LOCATION DETAILED: LEFT PROXIMAL DORSAL FOREARM
LOCATION DETAILED: RIGHT BUTTOCK
LOCATION DETAILED: LEFT BUTTOCK
LOCATION DETAILED: RIGHT PROXIMAL PRETIBIAL REGION
LOCATION DETAILED: LEFT PROXIMAL POSTERIOR UPPER ARM

## 2025-03-31 ASSESSMENT — LOCATION ZONE DERM
LOCATION ZONE: LEG
LOCATION ZONE: TRUNK
LOCATION ZONE: ARM
LOCATION ZONE: FEET

## 2025-03-31 ASSESSMENT — LOCATION SIMPLE DESCRIPTION DERM
LOCATION SIMPLE: RIGHT UPPER ARM
LOCATION SIMPLE: RIGHT FOREARM
LOCATION SIMPLE: RIGHT PRETIBIAL REGION
LOCATION SIMPLE: LEFT BUTTOCK
LOCATION SIMPLE: RIGHT THIGH
LOCATION SIMPLE: RIGHT BUTTOCK
LOCATION SIMPLE: RIGHT POSTERIOR THIGH
LOCATION SIMPLE: LEFT FOOT
LOCATION SIMPLE: LEFT THIGH
LOCATION SIMPLE: LEFT POSTERIOR THIGH
LOCATION SIMPLE: ABDOMEN
LOCATION SIMPLE: LEFT FOREARM
LOCATION SIMPLE: RIGHT CALF
LOCATION SIMPLE: LEFT UPPER ARM
LOCATION SIMPLE: LEFT CALF
LOCATION SIMPLE: RIGHT FOOT
LOCATION SIMPLE: LEFT PRETIBIAL REGION

## 2025-03-31 ASSESSMENT — FIBROSIS 4 INDEX: FIB4 SCORE: 0.78

## 2025-03-31 NOTE — LETTER
March 31, 2025       Patient: William Carroll   YOB: 1980   Date of Visit: 3/31/2025         To Whom It May Concern:    In my medical opinion, I recommend that William Carroll be excused from work from 3/31/2025 through 4/2/2025 due to illness.     If you have any questions or concerns, please don't hesitate to call [phone]          Sincerely,          DAJA Mata.P.R.N.  Electronically Signed

## 2025-04-01 NOTE — PROGRESS NOTES
Verbal consent was acquired by the patient to use [employer] ambient listening note generation during this visit          Chief Complaint   Patient presents with    Abdominal Pain     Abdominal cramping and nausea x 1 day. Tx w pepto and otc nausea medication. Negative for vomiting.          History of Present Illness  The patient is a 44-year-old male who presents for evaluation of abdominal pain.    He reports experiencing severe abdominal pain, which he describes as cramping in nature. The onset of the pain was this morning, initially mild but progressively intensifying throughout the day. Despite the discomfort, he attended his dermatology appointment where laboratory tests were ordered. He also experienced an episode of diarrhea. The pain, which he rates as 8 on a scale of 0 to 10, is localized to the right side of his abdomen. He sought relief with Pepto-Bismol approximately an hour ago and an antiemetic from [facility] 20 minutes prior to his visit, but these interventions have not alleviated his symptoms. He also reports nausea and a sensation of needing to use the restroom. He expresses concern about potential dehydration, given his previous history of such. He notes that vomiting provides slight relief.    MEDICATIONS  Current: cephalexin, prednisone       ROS:    No severe shortness of breath   No cardiac like chest pain, as discussed   As otherwise stated in HPI    Medical/SX/ Social History:  Reviewed per chart    Pertinent Medications:    Current Outpatient Medications on File Prior to Visit   Medication Sig Dispense Refill    predniSONE (DELTASONE) 20 MG Tab TAKE 3 TABLETS BY MOUTH EVERY MORNING FOR 2 DAYS THEN TAKE 2 TABLETS BY MOUTH EVERY MORNING FOR 12 DAYS      budesonide (RINOCORT AQUA) 32 MCG/ACT nasal spray Administer 2 Sprays into affected nostril(S) every day. (Patient not taking: Reported on 3/31/2025) 8.43 mL 0    azelastine (ASTELIN) 137 MCG/SPRAY nasal spray Administer 1 Mackey into  affected nostril(S) 2 times a day as needed for Rhinitis. (Patient not taking: Reported on 3/31/2025) 30 mL 0    benzonatate (TESSALON) 200 MG capsule Take 1 Capsule by mouth 3 times a day as needed for Cough. (Patient not taking: Reported on 3/31/2025) 60 Capsule 0    dexamethasone (DECADRON) 6 MG Tab Take 1 Tablet by mouth every day. (Patient not taking: Reported on 12/7/2024) 3 Tablet 0    doxycycline (MONODOX) 100 MG capsule Take 1 Capsule by mouth 2 times a day. (Patient not taking: Reported on 12/7/2024) 14 Capsule 0    Loratadine (CLARITIN) 10 MG Cap Take 10 mg by mouth every day. (Patient not taking: Reported on 12/7/2024) 30 Capsule 1     No current facility-administered medications on file prior to visit.        Allergies:    Patient has no known allergies.     Problem list, medications, and allergies reviewed by myself today in Epic     Physical Exam:    Vitals:    03/31/25 0947   BP: (!) 142/92   Pulse: 80   Resp: 18   Temp: 36 °C (96.8 °F)   SpO2: 97%             Physical Exam  Vitals and nursing note reviewed.   Constitutional:       General: He is not in acute distress.     Appearance: Normal appearance. He is not ill-appearing or toxic-appearing.   HENT:      Head: Normocephalic and atraumatic.      Nose: Nose normal.      Mouth/Throat:      Mouth: Mucous membranes are moist.      Pharynx: Oropharynx is clear.   Eyes:      Extraocular Movements: Extraocular movements intact.      Conjunctiva/sclera: Conjunctivae normal.      Pupils: Pupils are equal, round, and reactive to light.   Cardiovascular:      Rate and Rhythm: Normal rate and regular rhythm.      Pulses: Normal pulses.      Heart sounds: Normal heart sounds.   Pulmonary:      Effort: Pulmonary effort is normal.   Abdominal:      General: Abdomen is protuberant. Bowel sounds are increased.      Palpations: Abdomen is soft. There is no shifting dullness, fluid wave, hepatomegaly, splenomegaly, mass or pulsatile mass.      Tenderness: There is  "generalized abdominal tenderness. There is no right CVA tenderness, left CVA tenderness, guarding or rebound. Negative signs include Lanier's sign, Rovsing's sign, McBurney's sign, psoas sign and obturator sign.      Hernia: No hernia is present.   Musculoskeletal:         General: Normal range of motion.      Cervical back: Normal range of motion and neck supple.   Skin:     General: Skin is warm.      Capillary Refill: Capillary refill takes less than 2 seconds.   Neurological:      General: No focal deficit present.      Mental Status: He is alert and oriented to person, place, and time.          Medical Decision making and plan :  I personally reviewed prior external notes and test results pertinent to today's visit. Pt is clinically stable at today's acute urgent care visit.  Patient appears nontoxic with no acute distress noted. Appropriate for outpatient care at this time.    Pleasant 44 y.o. male presented clinic with:     Assessment & Plan  1. Abdominal pain.  2. Gastroenteritis.  Patient is actively vomiting and having diarrhea in clinic.  The clinical presentation suggests a potential viral gastroenteritis, typically resolving within a 3-day period. A prescription for an antiemetic medication has been provided, which can be administered sublingually. He has been advised to maintain hydration with small sips of electrolyte-rich fluids and to rest his gastrointestinal system. A work note has been issued, excusing him from work until Wednesday. If the condition deteriorates or the pain becomes intolerable, he should seek immediate medical attention at the emergency room.    Gastroenteritis   Gastroenteritis is an illness of the intestines. It is sometimes called \"stomach flu\". It causes nausea, vomiting, stomach cramps, watery poop (diarrhea) and a slight fever. It is usually caused by a virus. This illness often clears up in 4-5 days. However, it can be serious when people who lose too much fluid from " "throwing up (vomiting) and/or diarrhea. When too much fluid is lost and has not been replaced, it is called dehydration.   HOME CARE   Wash your hands a lot.   Rest.   Drink fluids slowly. Drinking too much or too fast can cause you to throw up.   Drink \"oral rehydration fluids\" (ORS) as directed. They can be bought in a grocery store or pharmacy. Ask your doctor or pharmacist how to take the ORS if you do not understand.   Do not eat too much at once.   Avoid tobacco, alcohol and drugs that upset your stomach.   BRAT diet start eating bananas, rice, apples and dry toast   GET HELP RIGHT AWAY IF:   You become weak, dizzy, or faint.   You cannot keep fluids down.   You have a dry mouth, no tears and pee less. These are signs of dehydration.   Belly (abdominal) pain starts, feels worse, or stays in one place.   You or your child has a fever above 102º F (38.9º C) for more than 1 day.   Diarrhea has blood or mucous in it.   You become confused.   After 5-7 days, you are still throwing up and/or having diarrhea.   MAKE SURE YOU:   Understand these instructions.   Will watch your condition.   Will get help right away if you are not doing well or get worse.   Document Released: 06/05/2009 Document Re-Released: 11/30/2009   [employer]® Patient Information ©2011 [employer].    Shared decision-making was utilized with patient for treatment plan. Medication discussed included indication for use and the potential benefits and side effects. Education was provided regarding the aforementioned assessments.  Differential Diagnosis, natural history, and supportive care discussed. All of the patient's questions were answered to their satisfaction at the time of discharge. Patient was encouraged to monitor symptoms closely. Those signs and symptoms which would warrant concern and mandate seeking a higher level of service through the emergency department discussed at length.  Patient stated agreement and understanding of this plan of " care.    Disposition:  Home in stable condition     Voice Recognition Disclaimer:  Portions of this document were created using voice recognition software and [employer] technology provided by [employer]. The software does have a chance of producing errors of grammar and possibly content. I have made every reasonable attempt to correct obvious errors, but there may be errors of grammar and possibly content that I did not discover before finalizing the  documentation.    JOCELYN Mata.

## 2025-04-02 ENCOUNTER — HOSPITAL ENCOUNTER (OUTPATIENT)
Dept: LAB | Facility: MEDICAL CENTER | Age: 45
End: 2025-04-02
Attending: PHYSICIAN ASSISTANT
Payer: COMMERCIAL

## 2025-04-02 LAB
ALBUMIN SERPL BCP-MCNC: 4.4 G/DL (ref 3.2–4.9)
ALBUMIN/GLOB SERPL: 1.4 G/DL
ALP SERPL-CCNC: 57 U/L (ref 30–99)
ALT SERPL-CCNC: 27 U/L (ref 2–50)
ANION GAP SERPL CALC-SCNC: 12 MMOL/L (ref 7–16)
APPEARANCE UR: CLEAR
AST SERPL-CCNC: 20 U/L (ref 12–45)
BASOPHILS # BLD AUTO: 0.4 % (ref 0–1.8)
BASOPHILS # BLD: 0.03 K/UL (ref 0–0.12)
BILIRUB SERPL-MCNC: 0.3 MG/DL (ref 0.1–1.5)
BILIRUB UR QL STRIP.AUTO: NEGATIVE
BUN SERPL-MCNC: 14 MG/DL (ref 8–22)
CALCIUM ALBUM COR SERPL-MCNC: 9.5 MG/DL (ref 8.5–10.5)
CALCIUM SERPL-MCNC: 9.8 MG/DL (ref 8.5–10.5)
CHLORIDE SERPL-SCNC: 104 MMOL/L (ref 96–112)
CO2 SERPL-SCNC: 24 MMOL/L (ref 20–33)
COLOR UR: YELLOW
CREAT SERPL-MCNC: 0.92 MG/DL (ref 0.5–1.4)
EOSINOPHIL # BLD AUTO: 0.02 K/UL (ref 0–0.51)
EOSINOPHIL NFR BLD: 0.3 % (ref 0–6.9)
ERYTHROCYTE [DISTWIDTH] IN BLOOD BY AUTOMATED COUNT: 41 FL (ref 35.9–50)
GFR SERPLBLD CREATININE-BSD FMLA CKD-EPI: 106 ML/MIN/1.73 M 2
GLOBULIN SER CALC-MCNC: 3.2 G/DL (ref 1.9–3.5)
GLUCOSE SERPL-MCNC: 115 MG/DL (ref 65–99)
GLUCOSE UR STRIP.AUTO-MCNC: NEGATIVE MG/DL
HCT VFR BLD AUTO: 44.4 % (ref 42–52)
HGB BLD-MCNC: 15.2 G/DL (ref 14–18)
IMM GRANULOCYTES # BLD AUTO: 0.07 K/UL (ref 0–0.11)
IMM GRANULOCYTES NFR BLD AUTO: 1 % (ref 0–0.9)
KETONES UR STRIP.AUTO-MCNC: NEGATIVE MG/DL
LEUKOCYTE ESTERASE UR QL STRIP.AUTO: NEGATIVE
LYMPHOCYTES # BLD AUTO: 0.8 K/UL (ref 1–4.8)
LYMPHOCYTES NFR BLD: 11.3 % (ref 22–41)
MCH RBC QN AUTO: 29.7 PG (ref 27–33)
MCHC RBC AUTO-ENTMCNC: 34.2 G/DL (ref 32.3–36.5)
MCV RBC AUTO: 86.9 FL (ref 81.4–97.8)
MICRO URNS: NORMAL
MONOCYTES # BLD AUTO: 0.31 K/UL (ref 0–0.85)
MONOCYTES NFR BLD AUTO: 4.4 % (ref 0–13.4)
NEUTROPHILS # BLD AUTO: 5.83 K/UL (ref 1.82–7.42)
NEUTROPHILS NFR BLD: 82.6 % (ref 44–72)
NITRITE UR QL STRIP.AUTO: NEGATIVE
NRBC # BLD AUTO: 0 K/UL
NRBC BLD-RTO: 0 /100 WBC (ref 0–0.2)
PH UR STRIP.AUTO: 6 [PH] (ref 5–8)
PLATELET # BLD AUTO: 259 K/UL (ref 164–446)
PMV BLD AUTO: 11 FL (ref 9–12.9)
POTASSIUM SERPL-SCNC: 4.4 MMOL/L (ref 3.6–5.5)
PROT SERPL-MCNC: 7.6 G/DL (ref 6–8.2)
PROT UR QL STRIP: NEGATIVE MG/DL
RBC # BLD AUTO: 5.11 M/UL (ref 4.7–6.1)
RBC UR QL AUTO: NEGATIVE
SODIUM SERPL-SCNC: 140 MMOL/L (ref 135–145)
SP GR UR STRIP.AUTO: 1.02
UROBILINOGEN UR STRIP.AUTO-MCNC: 1 EU/DL
WBC # BLD AUTO: 7.1 K/UL (ref 4.8–10.8)

## 2025-04-02 PROCEDURE — 36415 COLL VENOUS BLD VENIPUNCTURE: CPT

## 2025-04-02 PROCEDURE — 85025 COMPLETE CBC W/AUTO DIFF WBC: CPT

## 2025-04-02 PROCEDURE — 81003 URINALYSIS AUTO W/O SCOPE: CPT

## 2025-04-02 PROCEDURE — 80053 COMPREHEN METABOLIC PANEL: CPT

## 2025-04-02 PROCEDURE — 83516 IMMUNOASSAY NONANTIBODY: CPT

## 2025-04-03 ENCOUNTER — APPOINTMENT (OUTPATIENT)
Dept: URBAN - METROPOLITAN AREA CLINIC 15 | Facility: CLINIC | Age: 45
Setting detail: DERMATOLOGY
End: 2025-04-03

## 2025-04-03 DIAGNOSIS — M31.0 HYPERSENSITIVITY ANGIITIS: ICD-10-CM

## 2025-04-03 DIAGNOSIS — L91.8 OTHER HYPERTROPHIC DISORDERS OF THE SKIN: ICD-10-CM

## 2025-04-03 PROCEDURE — ? PRESCRIPTION MEDICATION MANAGEMENT

## 2025-04-03 PROCEDURE — ? DIAGNOSIS COMMENT

## 2025-04-03 PROCEDURE — ? ADDITIONAL NOTES

## 2025-04-03 PROCEDURE — ? COUNSELING

## 2025-04-03 PROCEDURE — 11200 RMVL SKIN TAGS UP TO&INC 15: CPT

## 2025-04-03 PROCEDURE — 99214 OFFICE O/P EST MOD 30 MIN: CPT | Mod: 25

## 2025-04-03 PROCEDURE — ? SKIN TAG REMOVAL

## 2025-04-03 ASSESSMENT — LOCATION DETAILED DESCRIPTION DERM
LOCATION DETAILED: RIGHT SUPERIOR MEDIAL MALAR CHEEK
LOCATION DETAILED: LEFT DISTAL PRETIBIAL REGION
LOCATION DETAILED: RIGHT PROXIMAL PRETIBIAL REGION

## 2025-04-03 ASSESSMENT — LOCATION ZONE DERM
LOCATION ZONE: FACE
LOCATION ZONE: LEG

## 2025-04-03 ASSESSMENT — LOCATION SIMPLE DESCRIPTION DERM
LOCATION SIMPLE: LEFT PRETIBIAL REGION
LOCATION SIMPLE: RIGHT PRETIBIAL REGION
LOCATION SIMPLE: RIGHT CHEEK

## 2025-04-04 LAB
MYELOPEROXIDASE AB SER-ACNC: 0 AU/ML (ref 0–19)
PROTEINASE3 AB SER-ACNC: 0 AU/ML (ref 0–19)

## 2025-04-10 ENCOUNTER — APPOINTMENT (OUTPATIENT)
Dept: URBAN - METROPOLITAN AREA CLINIC 15 | Facility: CLINIC | Age: 45
Setting detail: DERMATOLOGY
End: 2025-04-10

## 2025-04-10 DIAGNOSIS — Z48.02 ENCOUNTER FOR REMOVAL OF SUTURES: ICD-10-CM

## 2025-04-10 PROCEDURE — ? SUTURE REMOVAL (GLOBAL PERIOD)

## 2025-04-10 PROCEDURE — ? COUNSELING

## 2025-04-10 PROCEDURE — ? ADDITIONAL NOTES

## 2025-04-10 ASSESSMENT — LOCATION DETAILED DESCRIPTION DERM
LOCATION DETAILED: RIGHT ANTERIOR PROXIMAL THIGH
LOCATION DETAILED: LEFT PROXIMAL PRETIBIAL REGION
LOCATION DETAILED: LEFT PROXIMAL PRETIBIAL REGION
LOCATION DETAILED: RIGHT ANTERIOR PROXIMAL THIGH

## 2025-04-10 ASSESSMENT — LOCATION ZONE DERM
LOCATION ZONE: LEG
LOCATION ZONE: LEG

## 2025-04-10 ASSESSMENT — LOCATION SIMPLE DESCRIPTION DERM
LOCATION SIMPLE: RIGHT THIGH
LOCATION SIMPLE: LEFT PRETIBIAL REGION
LOCATION SIMPLE: LEFT PRETIBIAL REGION
LOCATION SIMPLE: RIGHT THIGH

## 2025-04-10 NOTE — PROCEDURE: SUTURE REMOVAL (GLOBAL PERIOD)
Detail Level: Detailed
Add 52489 Cpt? (Important Note: In 2017 The Use Of 17056 Is Being Tracked By Cms To Determine Future Global Period Reimbursement For Global Periods): no

## 2025-04-10 NOTE — PROCEDURE: ADDITIONAL NOTES
Render Risk Assessment In Note?: no
Detail Level: Simple
Additional Notes: 04/10/2025:\\n\\nWe saw the patient this morning for suture removal. Healing is progressing well with no complications. There are no signs of infection, and the sites are clean and well-maintained. The patient will continue applying Vaseline and using a bandage for 3-4 days, particularly while at work.\\n\\nCrandy Hawley is out of the office today. The patient expressed a desire to speak with her, as he’s had an eventful week. I asked him to provide a summary of the situation so we can update Yari Hawley accordingly.\\n\\nThe last visit was on 04/03/25 (Thursday). The patient mentioned that he went out with friends that night and ate unhealthy foods (fried food, soda, snacks, etc.). The next morning, 04/04/25 (Friday), he noticed redness on his feet. Although he did not contact the office at the time, he stated he thought that the rash was not related to food (per Yari Hawley). Over the past 7 days, he’s been following a strict diet of vegetables and protein, avoiding sugar, fried food, and bread, and reports that the rash has improved since switching to this diet. He is still taking prednisone.\\n\\nThe patient has an appointment with Dr. Christiansen on 04/11/25 (Friday) and inquired if it was still necessary to attend. I confirmed he should keep that appointment. He will follow up with Yari Hawley after seeing Dr. Christiansen. The patient has our direct line for any further communication.

## 2025-04-10 NOTE — PROCEDURE: SUTURE REMOVAL (GLOBAL PERIOD)
Detail Level: Detailed
Add 93984 Cpt? (Important Note: In 2017 The Use Of 61341 Is Being Tracked By Cms To Determine Future Global Period Reimbursement For Global Periods): no

## 2025-04-10 NOTE — PROCEDURE: ADDITIONAL NOTES
Additional Notes: 04/10/2025:\\n\\nWe saw the patient this morning for suture removal. Healing is progressing well with no complications. There are no signs of infection, and the sites are clean and well-maintained. The patient will continue applying Vaseline and using a bandage for 3-4 days, particularly while at work.\\n\\nCrandy Hawley is out of the office today. The patient expressed a desire to speak with her, as he’s had an eventful week. I asked him to provide a summary of the situation so we can update Yari Hawley accordingly.\\n\\nThe last visit was on 04/03/25 (Thursday). The patient mentioned that he went out with friends that night and ate unhealthy foods (fried food, soda, snacks, etc.). The next morning, 04/04/25 (Friday), he noticed redness on his feet. Although he did not contact the office at the time, he stated he thought that the rash was not related to food (per Yari Hawley). Over the past 7 days, he’s been following a strict diet of vegetables and protein, avoiding sugar, fried food, and bread, and reports that the rash has improved since switching to this diet. He is still taking prednisone.\\n\\nThe patient has an appointment with Dr. Christiansen on 04/11/25 (Friday) and inquired if it was still necessary to attend. I confirmed he should keep that appointment. He will follow up with Yari Hawley after seeing Dr. Christiansen. The patient has our direct line for any further communication.
Render Risk Assessment In Note?: no
Detail Level: Simple

## 2025-04-11 ENCOUNTER — APPOINTMENT (OUTPATIENT)
Dept: URBAN - METROPOLITAN AREA CLINIC 4 | Facility: CLINIC | Age: 45
Setting detail: DERMATOLOGY
End: 2025-04-11

## 2025-04-11 ENCOUNTER — HOSPITAL ENCOUNTER (OUTPATIENT)
Dept: LAB | Facility: MEDICAL CENTER | Age: 45
End: 2025-04-11
Attending: DERMATOLOGY
Payer: COMMERCIAL

## 2025-04-11 DIAGNOSIS — M31.0 HYPERSENSITIVITY ANGIITIS: ICD-10-CM | Status: INADEQUATELY CONTROLLED

## 2025-04-11 PROBLEM — L30.9 DERMATITIS, UNSPECIFIED: Status: ACTIVE | Noted: 2025-04-11

## 2025-04-11 LAB
ALBUMIN SERPL BCP-MCNC: 4.6 G/DL (ref 3.2–4.9)
ALBUMIN/GLOB SERPL: 1.4 G/DL
ALP SERPL-CCNC: 66 U/L (ref 30–99)
ALT SERPL-CCNC: 39 U/L (ref 2–50)
ANION GAP SERPL CALC-SCNC: 15 MMOL/L (ref 7–16)
APPEARANCE UR: CLEAR
AST SERPL-CCNC: 28 U/L (ref 12–45)
BILIRUB SERPL-MCNC: 0.5 MG/DL (ref 0.1–1.5)
BILIRUB UR QL STRIP.AUTO: NEGATIVE
BUN SERPL-MCNC: 12 MG/DL (ref 8–22)
C3 SERPL-MCNC: 195 MG/DL (ref 87–200)
C4 SERPL-MCNC: 29.5 MG/DL (ref 19–52)
CALCIUM ALBUM COR SERPL-MCNC: 9.2 MG/DL (ref 8.5–10.5)
CALCIUM SERPL-MCNC: 9.7 MG/DL (ref 8.5–10.5)
CHLORIDE SERPL-SCNC: 103 MMOL/L (ref 96–112)
CO2 SERPL-SCNC: 20 MMOL/L (ref 20–33)
COLOR UR: YELLOW
CREAT SERPL-MCNC: 0.88 MG/DL (ref 0.5–1.4)
GFR SERPLBLD CREATININE-BSD FMLA CKD-EPI: 109 ML/MIN/1.73 M 2
GLOBULIN SER CALC-MCNC: 3.3 G/DL (ref 1.9–3.5)
GLUCOSE SERPL-MCNC: 95 MG/DL (ref 65–99)
GLUCOSE UR STRIP.AUTO-MCNC: NEGATIVE MG/DL
KETONES UR STRIP.AUTO-MCNC: 15 MG/DL
LEUKOCYTE ESTERASE UR QL STRIP.AUTO: NEGATIVE
MICRO URNS: ABNORMAL
NITRITE UR QL STRIP.AUTO: NEGATIVE
PH UR STRIP.AUTO: 6 [PH] (ref 5–8)
POTASSIUM SERPL-SCNC: 3.6 MMOL/L (ref 3.6–5.5)
PROT SERPL-MCNC: 7.9 G/DL (ref 6–8.2)
PROT UR QL STRIP: NEGATIVE MG/DL
RBC UR QL AUTO: NEGATIVE
SODIUM SERPL-SCNC: 138 MMOL/L (ref 135–145)
SP GR UR STRIP.AUTO: 1.01
UROBILINOGEN UR STRIP.AUTO-MCNC: 0.2 EU/DL

## 2025-04-11 PROCEDURE — ? COUNSELING

## 2025-04-11 PROCEDURE — 86803 HEPATITIS C AB TEST: CPT

## 2025-04-11 PROCEDURE — 80053 COMPREHEN METABOLIC PANEL: CPT

## 2025-04-11 PROCEDURE — ? PRESCRIPTION

## 2025-04-11 PROCEDURE — 86704 HEP B CORE ANTIBODY TOTAL: CPT

## 2025-04-11 PROCEDURE — ? DIAGNOSIS COMMENT

## 2025-04-11 PROCEDURE — 99214 OFFICE O/P EST MOD 30 MIN: CPT | Mod: 24,25

## 2025-04-11 PROCEDURE — 86160 COMPLEMENT ANTIGEN: CPT | Mod: 91

## 2025-04-11 PROCEDURE — 81003 URINALYSIS AUTO W/O SCOPE: CPT

## 2025-04-11 PROCEDURE — 82595 ASSAY OF CRYOGLOBULIN: CPT

## 2025-04-11 PROCEDURE — 86705 HEP B CORE ANTIBODY IGM: CPT

## 2025-04-11 PROCEDURE — 82955 ASSAY OF G6PD ENZYME: CPT

## 2025-04-11 PROCEDURE — ? ORDER TESTS

## 2025-04-11 PROCEDURE — 86235 NUCLEAR ANTIGEN ANTIBODY: CPT | Mod: 91

## 2025-04-11 PROCEDURE — 11104 PUNCH BX SKIN SINGLE LESION: CPT | Mod: 79

## 2025-04-11 PROCEDURE — ? BIOPSY BY PUNCH METHOD FOR DIF

## 2025-04-11 PROCEDURE — 87340 HEPATITIS B SURFACE AG IA: CPT

## 2025-04-11 PROCEDURE — 36415 COLL VENOUS BLD VENIPUNCTURE: CPT

## 2025-04-11 RX ORDER — PREDNISONE 20 MG/1
TABLET ORAL QAM
Qty: 60 | Refills: 0 | Status: ERX

## 2025-04-11 ASSESSMENT — LOCATION ZONE DERM: LOCATION ZONE: TRUNK

## 2025-04-11 ASSESSMENT — LOCATION DETAILED DESCRIPTION DERM: LOCATION DETAILED: LEFT LATERAL ABDOMEN

## 2025-04-11 ASSESSMENT — LOCATION SIMPLE DESCRIPTION DERM: LOCATION SIMPLE: ABDOMEN

## 2025-04-11 NOTE — PROCEDURE: ORDER TESTS
Expected Date Of Service: 04/11/2025
Billing Type: Third-Party Bill
Performing Laboratory: 0
Bill For Surgical Tray: no

## 2025-04-11 NOTE — PROCEDURE: DIAGNOSIS COMMENT
Render Risk Assessment In Note?: no
Comment: Extensive LCV now present x 2 months and with incomplete response to 40mg prednisone.  No clear medication / infectious trigger.  Biopsy for DIF obtained today.  Additional labs ordered including G6PD in anticipation for starting dapsone next week.  Long discussion with Moi today to be alert for any accompanying systemic symptoms including but not limited to chest pain, visual changes, headache, abdominal pain, arthralgias, blood in stool, blood in urine.
Detail Level: Simple

## 2025-04-12 LAB
HBV CORE AB SERPL QL IA: NONREACTIVE
HBV CORE IGM SER QL: NORMAL
HBV SURFACE AG SER QL: NORMAL
HCV AB SER QL: NORMAL

## 2025-04-14 LAB
ENA SM IGG SER-ACNC: 2 AU/ML (ref 0–40)
ENA SS-A 60KD AB SER-ACNC: 0 AU/ML (ref 0–40)
ENA SS-A IGG SER QL: 3 AU/ML (ref 0–40)
ENA SS-B IGG SER IA-ACNC: 0 AU/ML (ref 0–40)
U1 SNRNP IGG SER QL: 7 UNITS (ref 0–19)

## 2025-04-15 ENCOUNTER — HOSPITAL ENCOUNTER (OUTPATIENT)
Dept: LAB | Facility: MEDICAL CENTER | Age: 45
End: 2025-04-15
Attending: DERMATOLOGY
Payer: COMMERCIAL

## 2025-04-15 PROCEDURE — 36415 COLL VENOUS BLD VENIPUNCTURE: CPT

## 2025-04-15 PROCEDURE — 82955 ASSAY OF G6PD ENZYME: CPT

## 2025-04-18 LAB
CRYOGLOB SER QL 3D COLD INC: NORMAL
G6PD RBC-CCNC: 12.5 U/G HB (ref 9.9–16.6)

## 2025-04-21 ENCOUNTER — APPOINTMENT (OUTPATIENT)
Dept: URBAN - METROPOLITAN AREA CLINIC 4 | Facility: CLINIC | Age: 45
Setting detail: DERMATOLOGY
End: 2025-04-21

## 2025-04-21 VITALS
DIASTOLIC BLOOD PRESSURE: 78 MMHG | SYSTOLIC BLOOD PRESSURE: 130 MMHG | HEART RATE: 76 BPM | SYSTOLIC BLOOD PRESSURE: 124 MMHG | DIASTOLIC BLOOD PRESSURE: 80 MMHG

## 2025-04-21 DIAGNOSIS — J02.0 STREPTOCOCCAL PHARYNGITIS: ICD-10-CM

## 2025-04-21 DIAGNOSIS — D69.0 ALLERGIC PURPURA: ICD-10-CM

## 2025-04-21 PROCEDURE — ? PRESCRIPTION

## 2025-04-21 PROCEDURE — 99417 PROLNG OP E/M EACH 15 MIN: CPT

## 2025-04-21 PROCEDURE — ? COUNSELING

## 2025-04-21 PROCEDURE — 99215 OFFICE O/P EST HI 40 MIN: CPT

## 2025-04-21 PROCEDURE — ? ORDER TESTS

## 2025-04-21 RX ORDER — DAPSONE 25 MG/1
TABLET ORAL QD
Qty: 60 | Refills: 2 | Status: ERX | COMMUNITY
Start: 2025-04-21

## 2025-04-21 RX ADMIN — DAPSONE: 25 TABLET ORAL at 00:00

## 2025-04-25 ENCOUNTER — RX ONLY (RX ONLY)
Age: 45
End: 2025-04-25

## 2025-04-25 RX ORDER — MYCOPHENOLATE MOFETIL 500 MG/1
TABLET ORAL
Qty: 60 | Refills: 2 | Status: ERX | COMMUNITY
Start: 2025-04-25

## 2025-05-02 ENCOUNTER — APPOINTMENT (OUTPATIENT)
Dept: LAB | Facility: MEDICAL CENTER | Age: 45
End: 2025-05-02

## 2025-05-02 ENCOUNTER — APPOINTMENT (OUTPATIENT)
Dept: URBAN - METROPOLITAN AREA CLINIC 4 | Facility: CLINIC | Age: 45
Setting detail: DERMATOLOGY
End: 2025-05-02

## 2025-05-02 DIAGNOSIS — D69.0 ALLERGIC PURPURA: ICD-10-CM

## 2025-05-02 PROCEDURE — ? ORDER TESTS

## 2025-05-02 PROCEDURE — ? PRESCRIPTION

## 2025-05-02 PROCEDURE — ? COUNSELING

## 2025-05-02 PROCEDURE — 99214 OFFICE O/P EST MOD 30 MIN: CPT

## 2025-05-02 RX ORDER — MYCOPHENOLATE MOFETIL 500 MG/1
TABLET ORAL QD
Qty: 90 | Refills: 0 | Status: ERX

## 2025-05-02 NOTE — PROCEDURE: COUNSELING
Detail Level: Detailed
Patient Specific Counseling (Will Not Stick From Patient To Patient): Discussed monitoring BPs at home, likelihood of kidney involvement moving forward, and adding dapsone to regimen. Will increase to 1500 mg mycophenylate qd given patient’s report of improvement with 1000 mg qd. Will continue 40 mg prednisone, counseled that he can decrease to 30 on Monday if he feels improved over the weekend.

## 2025-05-02 NOTE — PROCEDURE: ORDER TESTS
Bill For Surgical Tray: no
Billing Type: Third-Party Bill
Performing Laboratory: 0
Expected Date Of Service: 05/02/2025

## 2025-05-08 ENCOUNTER — HOSPITAL ENCOUNTER (OUTPATIENT)
Dept: LAB | Facility: MEDICAL CENTER | Age: 45
End: 2025-05-08
Attending: DERMATOLOGY
Payer: COMMERCIAL

## 2025-05-08 LAB
ALBUMIN SERPL BCP-MCNC: 4.4 G/DL (ref 3.2–4.9)
ALBUMIN/GLOB SERPL: 1.4 G/DL
ALP SERPL-CCNC: 60 U/L (ref 30–99)
ALT SERPL-CCNC: 29 U/L (ref 2–50)
ANION GAP SERPL CALC-SCNC: 10 MMOL/L (ref 7–16)
APPEARANCE UR: CLEAR
AST SERPL-CCNC: 20 U/L (ref 12–45)
BASOPHILS # BLD AUTO: 0.6 % (ref 0–1.8)
BASOPHILS # BLD: 0.04 K/UL (ref 0–0.12)
BILIRUB SERPL-MCNC: 0.3 MG/DL (ref 0.1–1.5)
BILIRUB UR QL STRIP.AUTO: NEGATIVE
BUN SERPL-MCNC: 14 MG/DL (ref 8–22)
CALCIUM ALBUM COR SERPL-MCNC: 9.8 MG/DL (ref 8.5–10.5)
CALCIUM SERPL-MCNC: 10.1 MG/DL (ref 8.5–10.5)
CHLORIDE SERPL-SCNC: 101 MMOL/L (ref 96–112)
CO2 SERPL-SCNC: 27 MMOL/L (ref 20–33)
COLOR UR: YELLOW
CREAT SERPL-MCNC: 0.91 MG/DL (ref 0.5–1.4)
EOSINOPHIL # BLD AUTO: 0.07 K/UL (ref 0–0.51)
EOSINOPHIL NFR BLD: 1 % (ref 0–6.9)
ERYTHROCYTE [DISTWIDTH] IN BLOOD BY AUTOMATED COUNT: 43.4 FL (ref 35.9–50)
GFR SERPLBLD CREATININE-BSD FMLA CKD-EPI: 107 ML/MIN/1.73 M 2
GLOBULIN SER CALC-MCNC: 3.2 G/DL (ref 1.9–3.5)
GLUCOSE SERPL-MCNC: 93 MG/DL (ref 65–99)
GLUCOSE UR STRIP.AUTO-MCNC: NEGATIVE MG/DL
HCT VFR BLD AUTO: 50.3 % (ref 42–52)
HGB BLD-MCNC: 16.1 G/DL (ref 14–18)
IMM GRANULOCYTES # BLD AUTO: 0.07 K/UL (ref 0–0.11)
IMM GRANULOCYTES NFR BLD AUTO: 1 % (ref 0–0.9)
KETONES UR STRIP.AUTO-MCNC: NEGATIVE MG/DL
LEUKOCYTE ESTERASE UR QL STRIP.AUTO: NEGATIVE
LYMPHOCYTES # BLD AUTO: 2.07 K/UL (ref 1–4.8)
LYMPHOCYTES NFR BLD: 29 % (ref 22–41)
MCH RBC QN AUTO: 28.4 PG (ref 27–33)
MCHC RBC AUTO-ENTMCNC: 32 G/DL (ref 32.3–36.5)
MCV RBC AUTO: 88.9 FL (ref 81.4–97.8)
MICRO URNS: NORMAL
MONOCYTES # BLD AUTO: 0.82 K/UL (ref 0–0.85)
MONOCYTES NFR BLD AUTO: 11.5 % (ref 0–13.4)
NEUTROPHILS # BLD AUTO: 4.07 K/UL (ref 1.82–7.42)
NEUTROPHILS NFR BLD: 56.9 % (ref 44–72)
NITRITE UR QL STRIP.AUTO: NEGATIVE
NRBC # BLD AUTO: 0 K/UL
NRBC BLD-RTO: 0 /100 WBC (ref 0–0.2)
PH UR STRIP.AUTO: 6 [PH] (ref 5–8)
PLATELET # BLD AUTO: 278 K/UL (ref 164–446)
PMV BLD AUTO: 11.4 FL (ref 9–12.9)
POTASSIUM SERPL-SCNC: 4.3 MMOL/L (ref 3.6–5.5)
PROT SERPL-MCNC: 7.6 G/DL (ref 6–8.2)
PROT UR QL STRIP: NEGATIVE MG/DL
RBC # BLD AUTO: 5.66 M/UL (ref 4.7–6.1)
RBC UR QL AUTO: NEGATIVE
SODIUM SERPL-SCNC: 138 MMOL/L (ref 135–145)
SP GR UR STRIP.AUTO: 1.01
UROBILINOGEN UR STRIP.AUTO-MCNC: 0.2 EU/DL
WBC # BLD AUTO: 7.1 K/UL (ref 4.8–10.8)

## 2025-05-08 PROCEDURE — 81003 URINALYSIS AUTO W/O SCOPE: CPT

## 2025-05-08 PROCEDURE — 36415 COLL VENOUS BLD VENIPUNCTURE: CPT

## 2025-05-08 PROCEDURE — 80053 COMPREHEN METABOLIC PANEL: CPT

## 2025-05-08 PROCEDURE — 85025 COMPLETE CBC W/AUTO DIFF WBC: CPT

## 2025-05-16 ENCOUNTER — APPOINTMENT (OUTPATIENT)
Dept: URBAN - METROPOLITAN AREA CLINIC 4 | Facility: CLINIC | Age: 45
Setting detail: DERMATOLOGY
End: 2025-05-16

## 2025-05-16 DIAGNOSIS — D69.0 ALLERGIC PURPURA: ICD-10-CM | Status: INADEQUATELY CONTROLLED

## 2025-05-16 PROCEDURE — ? ORDER TESTS

## 2025-05-16 PROCEDURE — ? PRESCRIPTION

## 2025-05-16 PROCEDURE — ? COUNSELING

## 2025-05-16 PROCEDURE — 99214 OFFICE O/P EST MOD 30 MIN: CPT

## 2025-05-16 RX ORDER — PREDNISONE 10 MG/1
TABLET ORAL QAM
Qty: 120 | Refills: 0 | Status: ERX | COMMUNITY
Start: 2025-05-16

## 2025-05-16 RX ADMIN — PREDNISONE: 10 TABLET ORAL at 00:00

## 2025-05-16 NOTE — PROCEDURE: ORDER TESTS
Bill For Surgical Tray: no
Performing Laboratory: 0
Expected Date Of Service: 05/16/2025
Billing Type: Third-Party Bill

## 2025-05-16 NOTE — PROCEDURE: COUNSELING
Detail Level: Detailed
Patient Specific Counseling (Will Not Stick From Patient To Patient): Discussed decreasing prednisone dose to 30 mg qam x 7 days, then 20 mg qam. If he flares at 20 mg will increase mycophenolate to 2 grams.  Consider addition of dapsone at next visit.  Will drawn labs before nephrology appt, f/u with us after.

## 2025-05-21 ENCOUNTER — HOSPITAL ENCOUNTER (OUTPATIENT)
Dept: LAB | Facility: MEDICAL CENTER | Age: 45
End: 2025-05-21
Attending: DERMATOLOGY
Payer: COMMERCIAL

## 2025-05-21 LAB
ALBUMIN SERPL BCP-MCNC: 4.3 G/DL (ref 3.2–4.9)
ALBUMIN/GLOB SERPL: 1.4 G/DL
ALP SERPL-CCNC: 58 U/L (ref 30–99)
ALT SERPL-CCNC: 23 U/L (ref 2–50)
ANION GAP SERPL CALC-SCNC: 13 MMOL/L (ref 7–16)
APPEARANCE UR: CLEAR
AST SERPL-CCNC: 18 U/L (ref 12–45)
BASOPHILS # BLD AUTO: 0.6 % (ref 0–1.8)
BASOPHILS # BLD: 0.05 K/UL (ref 0–0.12)
BILIRUB SERPL-MCNC: 0.4 MG/DL (ref 0.1–1.5)
BILIRUB UR QL STRIP.AUTO: NEGATIVE
BUN SERPL-MCNC: 14 MG/DL (ref 8–22)
CALCIUM ALBUM COR SERPL-MCNC: 9.7 MG/DL (ref 8.5–10.5)
CALCIUM SERPL-MCNC: 9.9 MG/DL (ref 8.5–10.5)
CHLORIDE SERPL-SCNC: 103 MMOL/L (ref 96–112)
CO2 SERPL-SCNC: 24 MMOL/L (ref 20–33)
COLOR UR: YELLOW
CREAT SERPL-MCNC: 0.99 MG/DL (ref 0.5–1.4)
EOSINOPHIL # BLD AUTO: 0.08 K/UL (ref 0–0.51)
EOSINOPHIL NFR BLD: 1 % (ref 0–6.9)
ERYTHROCYTE [DISTWIDTH] IN BLOOD BY AUTOMATED COUNT: 45.6 FL (ref 35.9–50)
GFR SERPLBLD CREATININE-BSD FMLA CKD-EPI: 97 ML/MIN/1.73 M 2
GLOBULIN SER CALC-MCNC: 3 G/DL (ref 1.9–3.5)
GLUCOSE SERPL-MCNC: 145 MG/DL (ref 65–99)
GLUCOSE UR STRIP.AUTO-MCNC: NEGATIVE MG/DL
HCT VFR BLD AUTO: 49.6 % (ref 42–52)
HGB BLD-MCNC: 15.9 G/DL (ref 14–18)
IMM GRANULOCYTES # BLD AUTO: 0.04 K/UL (ref 0–0.11)
IMM GRANULOCYTES NFR BLD AUTO: 0.5 % (ref 0–0.9)
KETONES UR STRIP.AUTO-MCNC: NEGATIVE MG/DL
LEUKOCYTE ESTERASE UR QL STRIP.AUTO: NEGATIVE
LYMPHOCYTES # BLD AUTO: 2.48 K/UL (ref 1–4.8)
LYMPHOCYTES NFR BLD: 30.7 % (ref 22–41)
MCH RBC QN AUTO: 29.1 PG (ref 27–33)
MCHC RBC AUTO-ENTMCNC: 32.1 G/DL (ref 32.3–36.5)
MCV RBC AUTO: 90.7 FL (ref 81.4–97.8)
MICRO URNS: NORMAL
MONOCYTES # BLD AUTO: 0.59 K/UL (ref 0–0.85)
MONOCYTES NFR BLD AUTO: 7.3 % (ref 0–13.4)
NEUTROPHILS # BLD AUTO: 4.84 K/UL (ref 1.82–7.42)
NEUTROPHILS NFR BLD: 59.9 % (ref 44–72)
NITRITE UR QL STRIP.AUTO: NEGATIVE
NRBC # BLD AUTO: 0 K/UL
NRBC BLD-RTO: 0 /100 WBC (ref 0–0.2)
PH UR STRIP.AUTO: 5.5 [PH] (ref 5–8)
PLATELET # BLD AUTO: 233 K/UL (ref 164–446)
PMV BLD AUTO: 11.2 FL (ref 9–12.9)
POTASSIUM SERPL-SCNC: 4 MMOL/L (ref 3.6–5.5)
PROT SERPL-MCNC: 7.3 G/DL (ref 6–8.2)
PROT UR QL STRIP: NEGATIVE MG/DL
RBC # BLD AUTO: 5.47 M/UL (ref 4.7–6.1)
RBC UR QL AUTO: NEGATIVE
SODIUM SERPL-SCNC: 140 MMOL/L (ref 135–145)
SP GR UR STRIP.AUTO: 1.02
UROBILINOGEN UR STRIP.AUTO-MCNC: 0.2 EU/DL
WBC # BLD AUTO: 8.1 K/UL (ref 4.8–10.8)

## 2025-05-21 PROCEDURE — 36415 COLL VENOUS BLD VENIPUNCTURE: CPT

## 2025-05-21 PROCEDURE — 80053 COMPREHEN METABOLIC PANEL: CPT

## 2025-05-21 PROCEDURE — 86258 DGP ANTIBODY EACH IG CLASS: CPT

## 2025-05-21 PROCEDURE — 81382 HLA II TYPING 1 LOC HR: CPT | Mod: 91

## 2025-05-21 PROCEDURE — 81003 URINALYSIS AUTO W/O SCOPE: CPT

## 2025-05-21 PROCEDURE — 82784 ASSAY IGA/IGD/IGG/IGM EACH: CPT

## 2025-05-21 PROCEDURE — 85025 COMPLETE CBC W/AUTO DIFF WBC: CPT

## 2025-05-21 PROCEDURE — 86364 TISS TRNSGLTMNASE EA IG CLAS: CPT

## 2025-05-23 LAB
GLIADIN IGA SER IA-ACNC: <0.72 FLU (ref 0–4.99)
TTG IGA SER IA-ACNC: <1.02 FLU (ref 0–4.99)

## 2025-05-24 LAB — IGA SERPL-MCNC: 282 MG/DL (ref 68–408)

## 2025-06-03 LAB
ANNOTATION COMMENT IMP: NORMAL
HLA-DQA1 HIGH RES: NORMAL
HLA-DQA1 HIGH RES: NORMAL
HLA-DQB1 HIGH RES: NORMAL
HLA-DQB1 HIGH RES: NORMAL

## 2025-08-15 ENCOUNTER — HOSPITAL ENCOUNTER (OUTPATIENT)
Dept: LAB | Facility: MEDICAL CENTER | Age: 45
End: 2025-08-15
Attending: DERMATOLOGY
Payer: COMMERCIAL

## 2025-08-15 LAB
ALBUMIN SERPL BCP-MCNC: 4.2 G/DL (ref 3.2–4.9)
ALBUMIN/GLOB SERPL: 1.5 G/DL
ALP SERPL-CCNC: 66 U/L (ref 30–99)
ALT SERPL-CCNC: 22 U/L (ref 2–50)
ANION GAP SERPL CALC-SCNC: 12 MMOL/L (ref 7–16)
APPEARANCE UR: CLEAR
AST SERPL-CCNC: 26 U/L (ref 12–45)
BASOPHILS # BLD AUTO: 1.2 % (ref 0–1.8)
BASOPHILS # BLD: 0.06 K/UL (ref 0–0.12)
BILIRUB SERPL-MCNC: 0.4 MG/DL (ref 0.1–1.5)
BILIRUB UR QL STRIP.AUTO: NEGATIVE
BUN SERPL-MCNC: 14 MG/DL (ref 8–22)
CALCIUM ALBUM COR SERPL-MCNC: 8.9 MG/DL (ref 8.5–10.5)
CALCIUM SERPL-MCNC: 9.1 MG/DL (ref 8.5–10.5)
CHLORIDE SERPL-SCNC: 107 MMOL/L (ref 96–112)
CO2 SERPL-SCNC: 22 MMOL/L (ref 20–33)
COLOR UR: YELLOW
CREAT SERPL-MCNC: 0.92 MG/DL (ref 0.5–1.4)
EOSINOPHIL # BLD AUTO: 0.23 K/UL (ref 0–0.51)
EOSINOPHIL NFR BLD: 4.4 % (ref 0–6.9)
ERYTHROCYTE [DISTWIDTH] IN BLOOD BY AUTOMATED COUNT: 42.7 FL (ref 35.9–50)
GFR SERPLBLD CREATININE-BSD FMLA CKD-EPI: 106 ML/MIN/1.73 M 2
GLOBULIN SER CALC-MCNC: 2.8 G/DL (ref 1.9–3.5)
GLUCOSE SERPL-MCNC: 102 MG/DL (ref 65–99)
GLUCOSE UR STRIP.AUTO-MCNC: NEGATIVE MG/DL
HCT VFR BLD AUTO: 47.4 % (ref 42–52)
HGB BLD-MCNC: 15.9 G/DL (ref 14–18)
IMM GRANULOCYTES # BLD AUTO: 0.02 K/UL (ref 0–0.11)
IMM GRANULOCYTES NFR BLD AUTO: 0.4 % (ref 0–0.9)
KETONES UR STRIP.AUTO-MCNC: NEGATIVE MG/DL
LEUKOCYTE ESTERASE UR QL STRIP.AUTO: NEGATIVE
LYMPHOCYTES # BLD AUTO: 1.79 K/UL (ref 1–4.8)
LYMPHOCYTES NFR BLD: 34.6 % (ref 22–41)
MCH RBC QN AUTO: 29.4 PG (ref 27–33)
MCHC RBC AUTO-ENTMCNC: 33.5 G/DL (ref 32.3–36.5)
MCV RBC AUTO: 87.6 FL (ref 81.4–97.8)
MICRO URNS: NORMAL
MONOCYTES # BLD AUTO: 0.64 K/UL (ref 0–0.85)
MONOCYTES NFR BLD AUTO: 12.4 % (ref 0–13.4)
NEUTROPHILS # BLD AUTO: 2.44 K/UL (ref 1.82–7.42)
NEUTROPHILS NFR BLD: 47 % (ref 44–72)
NITRITE UR QL STRIP.AUTO: NEGATIVE
NRBC # BLD AUTO: 0 K/UL
NRBC BLD-RTO: 0 /100 WBC (ref 0–0.2)
PH UR STRIP.AUTO: 5.5 [PH] (ref 5–8)
PLATELET # BLD AUTO: 244 K/UL (ref 164–446)
PMV BLD AUTO: 11.9 FL (ref 9–12.9)
POTASSIUM SERPL-SCNC: 4.2 MMOL/L (ref 3.6–5.5)
PROT SERPL-MCNC: 7 G/DL (ref 6–8.2)
PROT UR QL STRIP: NEGATIVE MG/DL
RBC # BLD AUTO: 5.41 M/UL (ref 4.7–6.1)
RBC UR QL AUTO: NEGATIVE
SODIUM SERPL-SCNC: 141 MMOL/L (ref 135–145)
SP GR UR STRIP.AUTO: 1.02
UROBILINOGEN UR STRIP.AUTO-MCNC: 0.2 EU/DL
WBC # BLD AUTO: 5.2 K/UL (ref 4.8–10.8)

## 2025-08-15 PROCEDURE — 36415 COLL VENOUS BLD VENIPUNCTURE: CPT

## 2025-08-15 PROCEDURE — 81003 URINALYSIS AUTO W/O SCOPE: CPT

## 2025-08-15 PROCEDURE — 80053 COMPREHEN METABOLIC PANEL: CPT

## 2025-08-15 PROCEDURE — 85025 COMPLETE CBC W/AUTO DIFF WBC: CPT

## 2025-08-23 ENCOUNTER — APPOINTMENT (OUTPATIENT)
Dept: URBAN - METROPOLITAN AREA CLINIC 4 | Facility: CLINIC | Age: 45
Setting detail: DERMATOLOGY
End: 2025-08-23